# Patient Record
Sex: MALE | Employment: FULL TIME | URBAN - METROPOLITAN AREA
[De-identification: names, ages, dates, MRNs, and addresses within clinical notes are randomized per-mention and may not be internally consistent; named-entity substitution may affect disease eponyms.]

---

## 2020-12-30 ENCOUNTER — HOSPITAL ENCOUNTER (INPATIENT)
Facility: MEDICAL CENTER | Age: 65
LOS: 7 days | DRG: 177 | End: 2021-01-06
Attending: INTERNAL MEDICINE | Admitting: INTERNAL MEDICINE
Payer: COMMERCIAL

## 2020-12-30 DIAGNOSIS — J96.01 ACUTE RESPIRATORY FAILURE WITH HYPOXIA (HCC): ICD-10-CM

## 2020-12-30 DIAGNOSIS — J96.01 ACUTE HYPOXEMIC RESPIRATORY FAILURE DUE TO COVID-19 (HCC): ICD-10-CM

## 2020-12-30 DIAGNOSIS — U07.1 ACUTE HYPOXEMIC RESPIRATORY FAILURE DUE TO COVID-19 (HCC): ICD-10-CM

## 2020-12-30 PROCEDURE — 99223 1ST HOSP IP/OBS HIGH 75: CPT | Performed by: STUDENT IN AN ORGANIZED HEALTH CARE EDUCATION/TRAINING PROGRAM

## 2020-12-30 PROCEDURE — 770021 HCHG ROOM/CARE - ISO PRIVATE

## 2020-12-30 RX ORDER — FUROSEMIDE 20 MG/1
40 TABLET ORAL
Status: DISCONTINUED | OUTPATIENT
Start: 2020-12-31 | End: 2021-01-01

## 2020-12-30 RX ORDER — BISACODYL 10 MG
10 SUPPOSITORY, RECTAL RECTAL
Status: DISCONTINUED | OUTPATIENT
Start: 2020-12-30 | End: 2020-12-31

## 2020-12-30 RX ORDER — POLYETHYLENE GLYCOL 3350 17 G/17G
1 POWDER, FOR SOLUTION ORAL
Status: DISCONTINUED | OUTPATIENT
Start: 2020-12-30 | End: 2020-12-31

## 2020-12-30 RX ORDER — ATORVASTATIN CALCIUM 20 MG/1
20 TABLET, FILM COATED ORAL EVERY EVENING
Status: DISCONTINUED | OUTPATIENT
Start: 2020-12-31 | End: 2020-12-31

## 2020-12-30 RX ORDER — ONDANSETRON 2 MG/ML
4 INJECTION INTRAMUSCULAR; INTRAVENOUS EVERY 6 HOURS PRN
Status: DISCONTINUED | OUTPATIENT
Start: 2020-12-30 | End: 2021-01-06 | Stop reason: HOSPADM

## 2020-12-30 RX ORDER — DEXAMETHASONE 4 MG/1
6 TABLET ORAL DAILY
Status: DISCONTINUED | OUTPATIENT
Start: 2020-12-31 | End: 2021-01-06 | Stop reason: HOSPADM

## 2020-12-30 RX ORDER — GUAIFENESIN/DEXTROMETHORPHAN 100-10MG/5
10 SYRUP ORAL EVERY 6 HOURS PRN
Status: DISCONTINUED | OUTPATIENT
Start: 2020-12-30 | End: 2020-12-31

## 2020-12-30 RX ORDER — LABETALOL HYDROCHLORIDE 5 MG/ML
10 INJECTION, SOLUTION INTRAVENOUS EVERY 6 HOURS PRN
Status: DISCONTINUED | OUTPATIENT
Start: 2020-12-30 | End: 2021-01-06

## 2020-12-30 RX ORDER — ONDANSETRON 4 MG/1
4 TABLET, ORALLY DISINTEGRATING ORAL EVERY 6 HOURS PRN
Status: DISCONTINUED | OUTPATIENT
Start: 2020-12-30 | End: 2021-01-06 | Stop reason: HOSPADM

## 2020-12-30 RX ORDER — OMEPRAZOLE 20 MG/1
40 CAPSULE, DELAYED RELEASE ORAL DAILY
Status: DISCONTINUED | OUTPATIENT
Start: 2020-12-31 | End: 2020-12-31

## 2020-12-30 RX ORDER — AMOXICILLIN 250 MG
2 CAPSULE ORAL 2 TIMES DAILY
Status: DISCONTINUED | OUTPATIENT
Start: 2020-12-30 | End: 2020-12-31

## 2020-12-30 RX ORDER — ACETAMINOPHEN 325 MG/1
650 TABLET ORAL EVERY 6 HOURS PRN
Status: DISCONTINUED | OUTPATIENT
Start: 2020-12-30 | End: 2020-12-30

## 2020-12-30 RX ORDER — ACETAMINOPHEN 325 MG/1
650 TABLET ORAL EVERY 6 HOURS PRN
Status: DISCONTINUED | OUTPATIENT
Start: 2020-12-30 | End: 2021-01-06 | Stop reason: HOSPADM

## 2020-12-30 SDOH — HEALTH STABILITY: MENTAL HEALTH: HOW MANY STANDARD DRINKS CONTAINING ALCOHOL DO YOU HAVE ON A TYPICAL DAY?: PATIENT DECLINED

## 2020-12-30 SDOH — HEALTH STABILITY: MENTAL HEALTH: HOW OFTEN DO YOU HAVE 6 OR MORE DRINKS ON ONE OCCASION?: PATIENT DECLINED

## 2020-12-30 SDOH — HEALTH STABILITY: MENTAL HEALTH: HOW OFTEN DO YOU HAVE A DRINK CONTAINING ALCOHOL?: PATIENT DECLINED

## 2020-12-30 ASSESSMENT — LIFESTYLE VARIABLES
TOTAL SCORE: 0
DOES PATIENT WANT TO STOP DRINKING: NO
ALCOHOL_USE: NO
EVER HAD A DRINK FIRST THING IN THE MORNING TO STEADY YOUR NERVES TO GET RID OF A HANGOVER: NO
HAVE PEOPLE ANNOYED YOU BY CRITICIZING YOUR DRINKING: NO
CONSUMPTION TOTAL: NEGATIVE
ON A TYPICAL DAY WHEN YOU DRINK ALCOHOL HOW MANY DRINKS DO YOU HAVE: 0
HOW MANY TIMES IN THE PAST YEAR HAVE YOU HAD 5 OR MORE DRINKS IN A DAY: 0
TOTAL SCORE: 0
AVERAGE NUMBER OF DAYS PER WEEK YOU HAVE A DRINK CONTAINING ALCOHOL: 0
TOTAL SCORE: 0
HAVE YOU EVER FELT YOU SHOULD CUT DOWN ON YOUR DRINKING: NO
EVER FELT BAD OR GUILTY ABOUT YOUR DRINKING: NO

## 2020-12-30 ASSESSMENT — COGNITIVE AND FUNCTIONAL STATUS - GENERAL
MOBILITY SCORE: 24
SUGGESTED CMS G CODE MODIFIER MOBILITY: CH
DAILY ACTIVITIY SCORE: 24
SUGGESTED CMS G CODE MODIFIER DAILY ACTIVITY: CH

## 2020-12-30 ASSESSMENT — PATIENT HEALTH QUESTIONNAIRE - PHQ9
1. LITTLE INTEREST OR PLEASURE IN DOING THINGS: NOT AT ALL
2. FEELING DOWN, DEPRESSED, IRRITABLE, OR HOPELESS: NOT AT ALL
SUM OF ALL RESPONSES TO PHQ9 QUESTIONS 1 AND 2: 0

## 2020-12-31 ENCOUNTER — APPOINTMENT (OUTPATIENT)
Dept: RADIOLOGY | Facility: MEDICAL CENTER | Age: 65
DRG: 177 | End: 2020-12-31
Attending: STUDENT IN AN ORGANIZED HEALTH CARE EDUCATION/TRAINING PROGRAM
Payer: COMMERCIAL

## 2020-12-31 PROBLEM — J96.01 ACUTE HYPOXEMIC RESPIRATORY FAILURE DUE TO COVID-19 (HCC): Status: ACTIVE | Noted: 2020-12-31

## 2020-12-31 PROBLEM — K21.9 GERD (GASTROESOPHAGEAL REFLUX DISEASE): Status: ACTIVE | Noted: 2020-12-31

## 2020-12-31 PROBLEM — J96.01 ACUTE RESPIRATORY FAILURE WITH HYPOXIA (HCC): Status: ACTIVE | Noted: 2020-12-31

## 2020-12-31 PROBLEM — U07.1 ACUTE HYPOXEMIC RESPIRATORY FAILURE DUE TO COVID-19 (HCC): Status: ACTIVE | Noted: 2020-12-31

## 2020-12-31 PROBLEM — E11.9 DIABETES (HCC): Status: ACTIVE | Noted: 2020-12-31

## 2020-12-31 PROBLEM — E66.9 CLASS 2 OBESITY IN ADULT: Status: ACTIVE | Noted: 2020-12-31

## 2020-12-31 PROBLEM — G47.33 OSA (OBSTRUCTIVE SLEEP APNEA): Status: ACTIVE | Noted: 2020-12-31

## 2020-12-31 PROBLEM — E78.5 HYPERLIPIDEMIA: Status: ACTIVE | Noted: 2020-12-31

## 2020-12-31 PROBLEM — I10 HYPERTENSION: Status: ACTIVE | Noted: 2020-12-31

## 2020-12-31 LAB
ALBUMIN SERPL BCP-MCNC: 3.6 G/DL (ref 3.2–4.9)
ALBUMIN/GLOB SERPL: 1 G/DL
ALP SERPL-CCNC: 89 U/L (ref 30–99)
ALT SERPL-CCNC: 33 U/L (ref 2–50)
ANION GAP SERPL CALC-SCNC: 9 MMOL/L (ref 7–16)
AST SERPL-CCNC: 29 U/L (ref 12–45)
BASOPHILS # BLD AUTO: 0.4 % (ref 0–1.8)
BASOPHILS # BLD: 0.03 K/UL (ref 0–0.12)
BILIRUB SERPL-MCNC: 0.4 MG/DL (ref 0.1–1.5)
BUN SERPL-MCNC: 13 MG/DL (ref 8–22)
CALCIUM SERPL-MCNC: 8.6 MG/DL (ref 8.5–10.5)
CHLORIDE SERPL-SCNC: 103 MMOL/L (ref 96–112)
CO2 SERPL-SCNC: 27 MMOL/L (ref 20–33)
CREAT SERPL-MCNC: 0.74 MG/DL (ref 0.5–1.4)
D DIMER PPP IA.FEU-MCNC: 0.67 UG/ML (FEU) (ref 0–0.5)
EOSINOPHIL # BLD AUTO: 0 K/UL (ref 0–0.51)
EOSINOPHIL NFR BLD: 0 % (ref 0–6.9)
ERYTHROCYTE [DISTWIDTH] IN BLOOD BY AUTOMATED COUNT: 53.2 FL (ref 35.9–50)
EST. AVERAGE GLUCOSE BLD GHB EST-MCNC: 163 MG/DL
GLOBULIN SER CALC-MCNC: 3.6 G/DL (ref 1.9–3.5)
GLUCOSE BLD-MCNC: 146 MG/DL (ref 65–99)
GLUCOSE BLD-MCNC: 164 MG/DL (ref 65–99)
GLUCOSE BLD-MCNC: 171 MG/DL (ref 65–99)
GLUCOSE BLD-MCNC: 195 MG/DL (ref 65–99)
GLUCOSE BLD-MCNC: 243 MG/DL (ref 65–99)
GLUCOSE SERPL-MCNC: 205 MG/DL (ref 65–99)
HBA1C MFR BLD: 7.3 % (ref 0–5.6)
HCT VFR BLD AUTO: 55.5 % (ref 42–52)
HGB BLD-MCNC: 16.8 G/DL (ref 14–18)
IMM GRANULOCYTES # BLD AUTO: 0.07 K/UL (ref 0–0.11)
IMM GRANULOCYTES NFR BLD AUTO: 0.9 % (ref 0–0.9)
LYMPHOCYTES # BLD AUTO: 0.62 K/UL (ref 1–4.8)
LYMPHOCYTES NFR BLD: 8.4 % (ref 22–41)
MAGNESIUM SERPL-MCNC: 2.1 MG/DL (ref 1.5–2.5)
MCH RBC QN AUTO: 28.5 PG (ref 27–33)
MCHC RBC AUTO-ENTMCNC: 30.3 G/DL (ref 33.7–35.3)
MCV RBC AUTO: 94.1 FL (ref 81.4–97.8)
MONOCYTES # BLD AUTO: 0.22 K/UL (ref 0–0.85)
MONOCYTES NFR BLD AUTO: 3 % (ref 0–13.4)
NEUTROPHILS # BLD AUTO: 6.43 K/UL (ref 1.82–7.42)
NEUTROPHILS NFR BLD: 87.3 % (ref 44–72)
NRBC # BLD AUTO: 0 K/UL
NRBC BLD-RTO: 0 /100 WBC
NT-PROBNP SERPL IA-MCNC: 114 PG/ML (ref 0–125)
PHOSPHATE SERPL-MCNC: 2.9 MG/DL (ref 2.5–4.5)
PLATELET # BLD AUTO: 178 K/UL (ref 164–446)
PMV BLD AUTO: 10.5 FL (ref 9–12.9)
POTASSIUM SERPL-SCNC: 4.2 MMOL/L (ref 3.6–5.5)
PROCALCITONIN SERPL-MCNC: 0.06 NG/ML
PROT SERPL-MCNC: 7.2 G/DL (ref 6–8.2)
RBC # BLD AUTO: 5.9 M/UL (ref 4.7–6.1)
SODIUM SERPL-SCNC: 139 MMOL/L (ref 135–145)
WBC # BLD AUTO: 7.4 K/UL (ref 4.8–10.8)

## 2020-12-31 PROCEDURE — 83880 ASSAY OF NATRIURETIC PEPTIDE: CPT

## 2020-12-31 PROCEDURE — 83735 ASSAY OF MAGNESIUM: CPT

## 2020-12-31 PROCEDURE — 85025 COMPLETE CBC W/AUTO DIFF WBC: CPT

## 2020-12-31 PROCEDURE — 84100 ASSAY OF PHOSPHORUS: CPT

## 2020-12-31 PROCEDURE — 87040 BLOOD CULTURE FOR BACTERIA: CPT

## 2020-12-31 PROCEDURE — 80053 COMPREHEN METABOLIC PANEL: CPT

## 2020-12-31 PROCEDURE — 86738 MYCOPLASMA ANTIBODY: CPT

## 2020-12-31 PROCEDURE — 82962 GLUCOSE BLOOD TEST: CPT | Mod: 91

## 2020-12-31 PROCEDURE — 94760 N-INVAS EAR/PLS OXIMETRY 1: CPT

## 2020-12-31 PROCEDURE — 99232 SBSQ HOSP IP/OBS MODERATE 35: CPT | Performed by: STUDENT IN AN ORGANIZED HEALTH CARE EDUCATION/TRAINING PROGRAM

## 2020-12-31 PROCEDURE — 700111 HCHG RX REV CODE 636 W/ 250 OVERRIDE (IP): Performed by: STUDENT IN AN ORGANIZED HEALTH CARE EDUCATION/TRAINING PROGRAM

## 2020-12-31 PROCEDURE — 36415 COLL VENOUS BLD VENIPUNCTURE: CPT

## 2020-12-31 PROCEDURE — 83036 HEMOGLOBIN GLYCOSYLATED A1C: CPT

## 2020-12-31 PROCEDURE — 84145 PROCALCITONIN (PCT): CPT

## 2020-12-31 PROCEDURE — 71045 X-RAY EXAM CHEST 1 VIEW: CPT

## 2020-12-31 PROCEDURE — 85379 FIBRIN DEGRADATION QUANT: CPT

## 2020-12-31 PROCEDURE — 770014 HCHG ROOM/CARE - WARD (150)

## 2020-12-31 PROCEDURE — 700102 HCHG RX REV CODE 250 W/ 637 OVERRIDE(OP): Performed by: STUDENT IN AN ORGANIZED HEALTH CARE EDUCATION/TRAINING PROGRAM

## 2020-12-31 PROCEDURE — A9270 NON-COVERED ITEM OR SERVICE: HCPCS | Performed by: STUDENT IN AN ORGANIZED HEALTH CARE EDUCATION/TRAINING PROGRAM

## 2020-12-31 RX ORDER — DEXTROSE MONOHYDRATE 25 G/50ML
50 INJECTION, SOLUTION INTRAVENOUS
Status: DISCONTINUED | OUTPATIENT
Start: 2020-12-31 | End: 2021-01-06 | Stop reason: HOSPADM

## 2020-12-31 RX ORDER — DEXTROSE MONOHYDRATE 25 G/50ML
50 INJECTION, SOLUTION INTRAVENOUS
Status: DISCONTINUED | OUTPATIENT
Start: 2020-12-31 | End: 2020-12-31

## 2020-12-31 RX ORDER — INSULIN GLARGINE 100 [IU]/ML
20 INJECTION, SOLUTION SUBCUTANEOUS EVERY EVENING
Status: DISCONTINUED | OUTPATIENT
Start: 2020-12-31 | End: 2020-12-31

## 2020-12-31 RX ORDER — AZITHROMYCIN 250 MG/1
500 TABLET, FILM COATED ORAL DAILY
Status: DISCONTINUED | OUTPATIENT
Start: 2020-12-31 | End: 2020-12-31

## 2020-12-31 RX ORDER — INSULIN GLARGINE 100 [IU]/ML
20 INJECTION, SOLUTION SUBCUTANEOUS
Status: DISCONTINUED | OUTPATIENT
Start: 2020-12-31 | End: 2021-01-06 | Stop reason: HOSPADM

## 2020-12-31 RX ORDER — INSULIN GLARGINE 100 [IU]/ML
10 INJECTION, SOLUTION SUBCUTANEOUS
Status: DISCONTINUED | OUTPATIENT
Start: 2020-12-31 | End: 2020-12-31

## 2020-12-31 RX ORDER — GUAIFENESIN 600 MG/1
600 TABLET, EXTENDED RELEASE ORAL EVERY 12 HOURS PRN
Status: DISCONTINUED | OUTPATIENT
Start: 2020-12-31 | End: 2021-01-06 | Stop reason: HOSPADM

## 2020-12-31 RX ORDER — AMOXICILLIN AND CLAVULANATE POTASSIUM 875; 125 MG/1; MG/1
1 TABLET, FILM COATED ORAL EVERY 12 HOURS
Status: DISCONTINUED | OUTPATIENT
Start: 2020-12-31 | End: 2020-12-31

## 2020-12-31 RX ORDER — ASPIRIN 81 MG/1
81 TABLET, CHEWABLE ORAL DAILY
Status: DISCONTINUED | OUTPATIENT
Start: 2020-12-31 | End: 2021-01-06 | Stop reason: HOSPADM

## 2020-12-31 RX ADMIN — INSULIN GLARGINE 20 UNITS: 100 INJECTION, SOLUTION SUBCUTANEOUS at 09:37

## 2020-12-31 RX ADMIN — FUROSEMIDE 40 MG: 20 TABLET ORAL at 09:36

## 2020-12-31 RX ADMIN — DEXAMETHASONE 6 MG: 4 TABLET ORAL at 09:37

## 2020-12-31 RX ADMIN — ASPIRIN 81 MG CHEWABLE TABLET 81 MG: 81 TABLET CHEWABLE at 05:05

## 2020-12-31 RX ADMIN — ENOXAPARIN SODIUM 40 MG: 40 INJECTION SUBCUTANEOUS at 09:37

## 2020-12-31 SDOH — ECONOMIC STABILITY: FOOD INSECURITY: WITHIN THE PAST 12 MONTHS, YOU WORRIED THAT YOUR FOOD WOULD RUN OUT BEFORE YOU GOT MONEY TO BUY MORE.: PATIENT DECLINED

## 2020-12-31 SDOH — ECONOMIC STABILITY: FOOD INSECURITY: WITHIN THE PAST 12 MONTHS, THE FOOD YOU BOUGHT JUST DIDN'T LAST AND YOU DIDN'T HAVE MONEY TO GET MORE.: PATIENT DECLINED

## 2020-12-31 SDOH — ECONOMIC STABILITY: TRANSPORTATION INSECURITY
IN THE PAST 12 MONTHS, HAS LACK OF TRANSPORTATION KEPT YOU FROM MEETINGS, WORK, OR FROM GETTING THINGS NEEDED FOR DAILY LIVING?: PATIENT DECLINED

## 2020-12-31 SDOH — ECONOMIC STABILITY: TRANSPORTATION INSECURITY
IN THE PAST 12 MONTHS, HAS THE LACK OF TRANSPORTATION KEPT YOU FROM MEDICAL APPOINTMENTS OR FROM GETTING MEDICATIONS?: PATIENT DECLINED

## 2020-12-31 ASSESSMENT — ENCOUNTER SYMPTOMS
FEVER: 0
BRUISES/BLEEDS EASILY: 0
DIARRHEA: 0
SENSORY CHANGE: 0
CONSTIPATION: 0
NAUSEA: 0
EYE DISCHARGE: 0
FLANK PAIN: 0
BACK PAIN: 0
HALLUCINATIONS: 0
BLURRED VISION: 0
VOMITING: 0
COUGH: 1
PALPITATIONS: 0
FOCAL WEAKNESS: 0
FEVER: 1
SPEECH CHANGE: 0
MYALGIAS: 1
ABDOMINAL PAIN: 0
NECK PAIN: 0
WEAKNESS: 1
FALLS: 0
SHORTNESS OF BREATH: 1
CHILLS: 1
WHEEZING: 0
CHILLS: 0
HEADACHES: 0
DOUBLE VISION: 0
DIZZINESS: 0
SORE THROAT: 0
LOSS OF CONSCIOUSNESS: 0
DIAPHORESIS: 0
HEMOPTYSIS: 0
NERVOUS/ANXIOUS: 0
TREMORS: 0
EYE PAIN: 0
DEPRESSION: 0
EYE REDNESS: 0

## 2020-12-31 ASSESSMENT — LIFESTYLE VARIABLES: SUBSTANCE_ABUSE: 0

## 2020-12-31 ASSESSMENT — COPD QUESTIONNAIRES
COPD SCREENING SCORE: 2
DO YOU EVER COUGH UP ANY MUCUS OR PHLEGM?: NO/ONLY WITH OCCASIONAL COLDS OR INFECTIONS
HAVE YOU SMOKED AT LEAST 100 CIGARETTES IN YOUR ENTIRE LIFE: NO/DON'T KNOW
DURING THE PAST 4 WEEKS HOW MUCH DID YOU FEEL SHORT OF BREATH: NONE/LITTLE OF THE TIME

## 2020-12-31 ASSESSMENT — PAIN DESCRIPTION - PAIN TYPE
TYPE: ACUTE PAIN
TYPE: ACUTE PAIN

## 2020-12-31 NOTE — ASSESSMENT & PLAN NOTE
Procalcitonin 0.06, proBNP 114, D-dimer 0.67, WBC 7.4  COVID-19 positive 12/30/20 at [place] [facility]  Echo 1/1/2021 unremarkable with normal EF of 65%, dilated ascending aorta 4.3 CM  Continue steroids regimen (last dose 1/8)  Continue remdesivir  Fluid restriction and diuresis with Lasix  Frequent pronation incentive spirometer  Up to chair for meals  Labs in a.m.

## 2020-12-31 NOTE — PROGRESS NOTES
[facility]PITAL[facility] TRIAGE OFFICER DIRECT ADMISSION REPORT  Transferring facility: [place] [facility]  Transferring physician: Dr Grant    Chief complaint: Hypoxemia  Pertinent history & patient course: 65-year-old male with hypertension, diabetes, obesity, who presented to the [facility] ED with hypoxemia.  He tested positive for COVID-19 after he presented with headaches since [place].  Labs showed lymphopenia.  Chest x-ray showed COVID infiltrates.  Patient was given Decadron.  He was saturating 65% on room air, and with 4 to 6 L oxygen by nasal cannula was desaturating to the mid 80s.  He was then placed on 6 L of nonrebreather mask with saturations in the mid 90s.  Otherwise, patient is hemodynamically stable.  The [facility] is not able to provide higher oxygen supplement such as high flow or BiPAP, and thus transfer for direct admission was requested.    Further work up or recommendations per triage officer prior to transfer: None  Consultants called prior to transfer and pertinent input from consultants: None  Patient accepted for transfer: Yes  Consultants to be called upon arrival: None  Admission status: Inpatient.   Floor requested: SMT/T7      Please inform the triage officer upon arrival of the patient to [facility] for assignment of a hospitalist to perform admission.     For any question or concerns regarding the care of this patient, please reach out to the assigned hospitalist.

## 2020-12-31 NOTE — THERAPY
Missed Therapy     Patient Name: Momo Gonzalez  Age:  65 y.o., Sex:  male  Medical Record #: [id number]  Today's Date: 12/31/2020 12/31/20 1132   Treatment Variance   Reason For Missed Therapy Non-Medical - Other (Please Comment)   Initial Contact Note    Initial Contact Note  Order Received and Verified. Speech Therapy Evaluation NOT Completed Because Patient Does Not Require Acute Speech Therapy at this Time.   Interdisciplinary Plan of Care Collaboration   IDT Collaboration with  Nursing   Collaboration Comments Per RN, clinical swallow evaluation not indicated. Tolerating regular diet and OK to cancel order. Please re-consult with any concern for aspiration or change in status.

## 2020-12-31 NOTE — H&P
Hospital Medicine History & Physical Note    Date of Service  12/31/2020    Primary Care Physician  No primary care provider on file.    Consultants  None    Code Status  Full Code    Chief Complaint  SOB    History of Presenting Illness  65 y.o. male with history of hypertension, diabetes, hyperlipidemia who presented 12/30/2020 to [place] ED with complains of hypoxia and shortness of breath then transferred to [facility] as direct admit after patient was found to have COVID-19.  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  He stated that his wife is also sick with COVID-19.  Reported generalized myalgia, subjective fever, headaches and progressive shortness of breath.  Patient noted his oxygen level as low as 50% in [place] ED, improved with 5 to 6 L oxygen support.  Patient was transferred to [facility] for higher level care.  At time of my evaluation, his saturation greater than 92% on 5 L, alert awake with no apparent respiratory distress.  He was given Decadron 6 mg prior to transfer.  Will transfer patient to [facility] will close monitoring and possible discharge in a.m. with home oxygen.    Review of Systems  Review of Systems   Constitutional: Positive for chills, fever and malaise/fatigue.   HENT: Negative for hearing loss and tinnitus.    Eyes: Negative for blurred vision and double vision.   Respiratory: Positive for cough and shortness of breath. Negative for hemoptysis.    Cardiovascular: Positive for leg swelling. Negative for chest pain and palpitations.   Gastrointestinal: Negative for abdominal pain and vomiting.   Genitourinary: Negative for dysuria and urgency.   Musculoskeletal: Positive for myalgias. Negative for falls.   Skin: Negative for itching and rash.   Neurological: Negative for dizziness, speech change, focal weakness and headaches.   Endo/Heme/Allergies: Does not bruise/bleed  easily.   Psychiatric/Behavioral: Negative for depression and suicidal ideas.   All other systems reviewed and are negative.      Past Medical History   has no past medical history on file.  Hypertension, hyperlipidemia, diabetes  MICHELLE no longer dependent on CPAP    Surgical History   has no past surgical history on file.   Tonsillectomy, uvulectomy for CPAP    Family History  family history is not on file.   Father  of brain cancer  Sibling, sister had PE unknown age    Social History  Distant history of smoking undergraduate years, no longer smokes  Social EtOH use    Allergies  No Known Allergies    Medications  None   Amlodipine 10 mg  Valsartan 40 mg  Metformin 500 mg twice daily  Atorvastatin 20 mg bedtime  Omeprazole 40 mg  Aspirin 81 mg    Physical Exam  Temp:  [35.8 °C (96.5 °F)-36.7 °C (98.1 °F)] 36.2 °C (97.2 °F)  Pulse:  [75-91] 76  Resp:  [16-22] 20  BP: ()/(58-77) 128/71  SpO2:  [91 %-98 %] 92 %    Physical Exam  Vitals signs and nursing note reviewed.   Constitutional:       Appearance: He is obese.   HENT:      Head: Normocephalic and atraumatic.      Mouth/Throat:      Mouth: Mucous membranes are dry.   Eyes:      General: No scleral icterus.     Extraocular Movements: Extraocular movements intact.   Neck:      Musculoskeletal: Neck supple. No muscular tenderness.   Cardiovascular:      Rate and Rhythm: Normal rate and regular rhythm.   Pulmonary:      Effort: Pulmonary effort is normal.      Comments: Diffuse inspiratory Rales and crackles  Decreased bibasilar breath sounds with appreciable crackles  Abdominal:      Palpations: Abdomen is soft.      Tenderness: There is no abdominal tenderness. There is no guarding or rebound.   Musculoskeletal: Normal range of motion.         General: No tenderness.      Comments: Trace lower extremity edema   Skin:     General: Skin is warm and dry.      Capillary Refill: Capillary refill takes less than 2 seconds.   Neurological:      General: No focal  deficit present.      Mental Status: He is alert and oriented to person, place, and time.      Motor: No weakness.   Psychiatric:         Mood and Affect: Mood normal.         Behavior: Behavior normal.         Laboratory:  Recent Labs     12/31/20  0012   WBC 7.4   RBC 5.90   HEMOGLOBIN 16.8   HEMATOCRIT 55.5*   MCV 94.1   MCH 28.5   MCHC 30.3*   RDW 53.2*   PLATELETCT 178   MPV 10.5     Recent Labs     12/31/20  0012   SODIUM 139   POTASSIUM 4.2   CHLORIDE 103   CO2 27   GLUCOSE 205*   BUN 13   CREATININE 0.74   CALCIUM 8.6     Recent Labs     12/31/20  0012   ALTSGPT 33   ASTSGOT 29   ALKPHOSPHAT 89   TBILIRUBIN 0.4   GLUCOSE 205*         Recent Labs     12/31/20  0012   NTPROBNP 114         No results for input(s): TROPONINT in the last 72 hours.    Imaging:  DX-CHEST-PORTABLE (1 VIEW)   Final Result         Diffuse interstitial prominence and hazy peripheral opacities bilaterally, concerning for Covid 19 pneumonia            Assessment/Plan:  I anticipate this patient is appropriate for observation status at this time.    * Acute hypoxemic respiratory failure due to COVID-19 (HCC)  Assessment & Plan  Procalcitonin 0.06, proBNP 114, D-dimer 0.67, WBC 7.4  COVID-19 positive 12/30/20 at [place] ED    Decadron 6mg x10 doses (12/30-1/8)  Procal WNL - no superimposed bacterial PNA  Currently on 5L oxygen - wean off as tolerated  No role for Remdesivir at this time - re-eval in AM  Self-proning encouraged    Acute respiratory failure with hypoxia ([facility])  Assessment & Plan  Secondary Covid  Minimally levated D-dimer defer LE doppler / CTA PE workup at this time  Diuresis as tolerated - Lasix 40mg PO daily x3 doses    Diabetes (HCC)  Assessment & Plan  Hold home metformin,    HbA1c 7.3  Lantus + ISS    Hypertension  Assessment & Plan  HOLD home amlodipine and losartan  Diurese with Lasix    GERD (gastroesophageal reflux disease)  Assessment & Plan  Omeprazole    MICHELLE (obstructive sleep apnea)  Assessment &  Plan  No longer requires CPAP since uvulectomy and tonsillectomy    Class 2 obesity in adult  Assessment & Plan  Diet and lifestyle modification discussed    Hyperlipidemia  Assessment & Plan  Continue statin    VTE ppx: Lovenox  Diet: DM/cardiac  Code: full - pt requested to be asked again in AM, considering DNR given his age  Dispo: Admit to [facility]

## 2020-12-31 NOTE — PROGRESS NOTES
[facility] Medicine Daily Progress Note    Date of Service  12/31/2020    Chief Complaint  65 y.o. male admitted 12/30/2020 with shortness of breath    Hospital Course  65 y.o. male with history of hypertension, diabetes, hyperlipidemia who presented 12/30/2020 to [place] ED with complains of hypoxia and shortness of breath then transferred to [facility] as direct admit after patient was found to have COVID-19.  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  He stated that his wife is also sick with COVID-19.  Reported generalized myalgia, subjective fever, headaches and progressive shortness of breath.  Patient noted his oxygen level as low as 50% in [place] ED, improved with 5 to 6 L oxygen support.  Patient was transferred to [facility] for higher level care.  At time of my evaluation, his saturation greater than 92% on 5 L, alert awake with no apparent respiratory distress.  He was given Decadron 6 mg prior to transfer.  Will transfer patient to [facility] will close monitoring and possible discharge in a.m. with home oxygen.      Interval Problem Update  I evaluated and examined the patient at the bedside.  Labs and imaging were reviewed. Care plan was discussed with the patient and bedside nurse.    Patient reported shortness of breath.  He denies nausea vomiting , chest pain , dizziness . oxygen increased to 6 L with oxygen saturation 93%    On Decadron, Lasix    Diabetes on lantus and sliding scale      Consultants/Specialty      Code Status  Full Code    Disposition  TBD    Review of Systems  Review of Systems   Constitutional: Positive for malaise/fatigue. Negative for chills, diaphoresis and fever.   HENT: Negative for congestion, ear discharge, ear pain and sore throat.    Eyes: Negative for pain, discharge and redness.   Respiratory: Positive for cough and shortness of breath. Negative for hemoptysis and  wheezing.    Cardiovascular: Negative for chest pain, palpitations and leg swelling.   Gastrointestinal: Negative for abdominal pain, constipation, diarrhea, nausea and vomiting.   Genitourinary: Negative for dysuria, flank pain, frequency, hematuria and urgency.   Musculoskeletal: Negative for back pain, falls, joint pain and neck pain.   Skin: Negative for itching.   Neurological: Positive for weakness. Negative for dizziness, tremors, sensory change, speech change, focal weakness, loss of consciousness and headaches.   Psychiatric/Behavioral: Negative for depression, hallucinations and substance abuse. The patient is not nervous/anxious.         Physical Exam  Temp:  [35.8 °C (96.5 °F)-36.7 °C (98.1 °F)] 36.1 °C (97 °F)  Pulse:  [82-91] 83  Resp:  [18-22] 18  BP: ()/(58-77) 119/77  SpO2:  [91 %-98 %] 93 %    Physical Exam  Constitutional:       General: He is not in acute distress.     Appearance: Normal appearance. He is obese. He is not ill-appearing or diaphoretic.   HENT:      Head: Normocephalic and atraumatic.      Nose: Nose normal.      Mouth/Throat:      Pharynx: Oropharynx is clear. No oropharyngeal exudate or posterior oropharyngeal erythema.   Eyes:      General:         Right eye: No discharge.         Left eye: No discharge.      Extraocular Movements: Extraocular movements intact.      Conjunctiva/sclera: Conjunctivae normal.      Pupils: Pupils are equal, round, and reactive to light.   Neck:      Musculoskeletal: Normal range of motion and neck supple. No neck rigidity or muscular tenderness.   Cardiovascular:      Rate and Rhythm: Normal rate and regular rhythm.      Pulses: Normal pulses.      Heart sounds: Normal heart sounds. No gallop.    Pulmonary:      Effort: Respiratory distress present.      Breath sounds: No stridor. Rales present. No wheezing or rhonchi.      Comments: Decreased breathing sounds  Chest:      Chest wall: No tenderness.   Abdominal:      General: Bowel sounds are  normal. There is no distension.      Palpations: Abdomen is soft.      Tenderness: There is no abdominal tenderness. There is no guarding or rebound.   Musculoskeletal: Normal range of motion.         General: No swelling, tenderness, deformity or signs of injury.      Right lower leg: No edema.      Left lower leg: No edema.   Neurological:      General: No focal deficit present.      Mental Status: He is alert and oriented to person, place, and time. Mental status is at baseline.      Sensory: No sensory deficit.      Motor: No weakness.      Gait: Gait normal.   Psychiatric:         Mood and Affect: Mood normal.         Behavior: Behavior normal.         Thought Content: Thought content normal.         Fluids  No intake or output data in the 24 hours ending 12/31/20 1232    Laboratory  Recent Labs     12/31/20  0012   WBC 7.4   RBC 5.90   HEMOGLOBIN 16.8   HEMATOCRIT 55.5*   MCV 94.1   MCH 28.5   MCHC 30.3*   RDW 53.2*   PLATELETCT 178   MPV 10.5     Recent Labs     12/31/20  0012   SODIUM 139   POTASSIUM 4.2   CHLORIDE 103   CO2 27   GLUCOSE 205*   BUN 13   CREATININE 0.74   CALCIUM 8.6                   Imaging  DX-CHEST-PORTABLE (1 VIEW)   Final Result         Diffuse interstitial prominence and hazy peripheral opacities bilaterally, concerning for Covid 19 pneumonia           Assessment/Plan  * Acute hypoxemic respiratory failure due to COVID-19 (HCC)  Assessment & Plan  Procalcitonin 0.06, proBNP 114, D-dimer 0.67, WBC 7.4  COVID-19 positive 12/30/20 at [place] ED    Decadron 6mg x10 doses (12/30-1/8)  Procal WNL - no superimposed bacterial PNA  Currently on 5L oxygen - wean off as tolerated  No role for Remdesivir at this time - re-eval in AM  Self-proning encouraged    Acute respiratory failure with hypoxia (HCC)  Assessment & Plan  Secondary Covid  Minimally levated D-dimer defer LE doppler / CTA PE workup at this time  Diuresis as tolerated - Lasix 40mg PO daily x3 doses    Diabetes  (HCC)  Assessment & Plan  Hold home metformin,    HbA1c 7.3  Lantus + ISS    Hypertension  Assessment & Plan  Go home amlodipine and losartan  Diurese with Lasix    GERD (gastroesophageal reflux disease)  Assessment & Plan  Omeprazole    MICHELLE (obstructive sleep apnea)  Assessment & Plan  No longer requires CPAP since uvulectomy and tonsillectomy    Class 2 obesity in adult  Assessment & Plan  Diet and lifestyle modification discussed    Hyperlipidemia  Assessment & Plan  Continue statin       VTE prophylaxis: Lovenox

## 2020-12-31 NOTE — ASSESSMENT & PLAN NOTE
Secondary Covid  Minimally levated D-dimer defer LE doppler / CTA PE workup at this time  Diuresis as tolerated - Lasix 40mg PO daily x3 doses

## 2021-01-01 ENCOUNTER — APPOINTMENT (OUTPATIENT)
Dept: CARDIOLOGY | Facility: MEDICAL CENTER | Age: 66
DRG: 177 | End: 2021-01-01
Attending: STUDENT IN AN ORGANIZED HEALTH CARE EDUCATION/TRAINING PROGRAM
Payer: COMMERCIAL

## 2021-01-01 PROBLEM — D72.829 LEUKOCYTOSIS: Status: ACTIVE | Noted: 2021-01-01

## 2021-01-01 PROBLEM — I77.810 ASCENDING AORTA DILATATION (HCC): Status: ACTIVE | Noted: 2021-01-01

## 2021-01-01 LAB
ANION GAP SERPL CALC-SCNC: 10 MMOL/L (ref 7–16)
BASOPHILS # BLD AUTO: 0.2 % (ref 0–1.8)
BASOPHILS # BLD: 0.02 K/UL (ref 0–0.12)
BUN SERPL-MCNC: 22 MG/DL (ref 8–22)
CALCIUM SERPL-MCNC: 8.8 MG/DL (ref 8.5–10.5)
CHLORIDE SERPL-SCNC: 100 MMOL/L (ref 96–112)
CO2 SERPL-SCNC: 28 MMOL/L (ref 20–33)
CREAT SERPL-MCNC: 0.78 MG/DL (ref 0.5–1.4)
EOSINOPHIL # BLD AUTO: 0 K/UL (ref 0–0.51)
EOSINOPHIL NFR BLD: 0 % (ref 0–6.9)
ERYTHROCYTE [DISTWIDTH] IN BLOOD BY AUTOMATED COUNT: 52.6 FL (ref 35.9–50)
GLUCOSE BLD-MCNC: 140 MG/DL (ref 65–99)
GLUCOSE BLD-MCNC: 141 MG/DL (ref 65–99)
GLUCOSE BLD-MCNC: 144 MG/DL (ref 65–99)
GLUCOSE BLD-MCNC: 149 MG/DL (ref 65–99)
GLUCOSE SERPL-MCNC: 185 MG/DL (ref 65–99)
HCT VFR BLD AUTO: 55.8 % (ref 42–52)
HGB BLD-MCNC: 16.8 G/DL (ref 14–18)
IMM GRANULOCYTES # BLD AUTO: 0.08 K/UL (ref 0–0.11)
IMM GRANULOCYTES NFR BLD AUTO: 0.6 % (ref 0–0.9)
LV EJECT FRACT MOD 2C 99903: 71.97
LV EJECT FRACT MOD 4C 99902: 76.65
LV EJECT FRACT MOD BP 99901: 74.95
LYMPHOCYTES # BLD AUTO: 0.55 K/UL (ref 1–4.8)
LYMPHOCYTES NFR BLD: 4.2 % (ref 22–41)
M PNEUMO IGM SER IA-ACNC: 0.08 U/L
MCH RBC QN AUTO: 28.4 PG (ref 27–33)
MCHC RBC AUTO-ENTMCNC: 30.1 G/DL (ref 33.7–35.3)
MCV RBC AUTO: 94.4 FL (ref 81.4–97.8)
MONOCYTES # BLD AUTO: 0.66 K/UL (ref 0–0.85)
MONOCYTES NFR BLD AUTO: 5 % (ref 0–13.4)
NEUTROPHILS # BLD AUTO: 11.79 K/UL (ref 1.82–7.42)
NEUTROPHILS NFR BLD: 90 % (ref 44–72)
NRBC # BLD AUTO: 0 K/UL
NRBC BLD-RTO: 0 /100 WBC
PLATELET # BLD AUTO: 228 K/UL (ref 164–446)
PMV BLD AUTO: 10.7 FL (ref 9–12.9)
POTASSIUM SERPL-SCNC: 4.4 MMOL/L (ref 3.6–5.5)
RBC # BLD AUTO: 5.91 M/UL (ref 4.7–6.1)
SODIUM SERPL-SCNC: 138 MMOL/L (ref 135–145)
WBC # BLD AUTO: 13.1 K/UL (ref 4.8–10.8)

## 2021-01-01 PROCEDURE — 700102 HCHG RX REV CODE 250 W/ 637 OVERRIDE(OP): Performed by: STUDENT IN AN ORGANIZED HEALTH CARE EDUCATION/TRAINING PROGRAM

## 2021-01-01 PROCEDURE — 700105 HCHG RX REV CODE 258: Performed by: INTERNAL MEDICINE

## 2021-01-01 PROCEDURE — 700111 HCHG RX REV CODE 636 W/ 250 OVERRIDE (IP): Performed by: STUDENT IN AN ORGANIZED HEALTH CARE EDUCATION/TRAINING PROGRAM

## 2021-01-01 PROCEDURE — 82962 GLUCOSE BLOOD TEST: CPT | Mod: 91

## 2021-01-01 PROCEDURE — 85025 COMPLETE CBC W/AUTO DIFF WBC: CPT

## 2021-01-01 PROCEDURE — 770014 HCHG ROOM/CARE - WARD (150)

## 2021-01-01 PROCEDURE — 36415 COLL VENOUS BLD VENIPUNCTURE: CPT

## 2021-01-01 PROCEDURE — 93325 DOPPLER ECHO COLOR FLOW MAPG: CPT

## 2021-01-01 PROCEDURE — A9270 NON-COVERED ITEM OR SERVICE: HCPCS | Performed by: STUDENT IN AN ORGANIZED HEALTH CARE EDUCATION/TRAINING PROGRAM

## 2021-01-01 PROCEDURE — XW033E5 INTRODUCTION OF REMDESIVIR ANTI-INFECTIVE INTO PERIPHERAL VEIN, PERCUTANEOUS APPROACH, NEW TECHNOLOGY GROUP 5: ICD-10-PCS | Performed by: INTERNAL MEDICINE

## 2021-01-01 PROCEDURE — 99233 SBSQ HOSP IP/OBS HIGH 50: CPT | Performed by: STUDENT IN AN ORGANIZED HEALTH CARE EDUCATION/TRAINING PROGRAM

## 2021-01-01 PROCEDURE — 80048 BASIC METABOLIC PNL TOTAL CA: CPT

## 2021-01-01 PROCEDURE — 700101 HCHG RX REV CODE 250: Performed by: INTERNAL MEDICINE

## 2021-01-01 PROCEDURE — 93308 TTE F-UP OR LMTD: CPT | Mod: 26 | Performed by: INTERNAL MEDICINE

## 2021-01-01 PROCEDURE — 97161 PT EVAL LOW COMPLEX 20 MIN: CPT

## 2021-01-01 RX ORDER — FUROSEMIDE 10 MG/ML
40 INJECTION INTRAMUSCULAR; INTRAVENOUS ONCE
Status: COMPLETED | OUTPATIENT
Start: 2021-01-01 | End: 2021-01-01

## 2021-01-01 RX ADMIN — REMDESIVIR 200 MG: 100 INJECTION, POWDER, LYOPHILIZED, FOR SOLUTION INTRAVENOUS at 14:42

## 2021-01-01 RX ADMIN — INSULIN GLARGINE 20 UNITS: 100 INJECTION, SOLUTION SUBCUTANEOUS at 05:57

## 2021-01-01 RX ADMIN — ASPIRIN 81 MG CHEWABLE TABLET 81 MG: 81 TABLET CHEWABLE at 06:00

## 2021-01-01 RX ADMIN — ENOXAPARIN SODIUM 40 MG: 40 INJECTION SUBCUTANEOUS at 05:59

## 2021-01-01 RX ADMIN — DEXAMETHASONE 6 MG: 4 TABLET ORAL at 06:00

## 2021-01-01 RX ADMIN — FUROSEMIDE 40 MG: 10 INJECTION, SOLUTION INTRAMUSCULAR; INTRAVENOUS at 11:38

## 2021-01-01 RX ADMIN — FUROSEMIDE 40 MG: 20 TABLET ORAL at 05:59

## 2021-01-01 ASSESSMENT — ENCOUNTER SYMPTOMS
NAUSEA: 0
HALLUCINATIONS: 0
DIZZINESS: 0
PALPITATIONS: 0
DEPRESSION: 0
FALLS: 0
CHILLS: 0
SENSORY CHANGE: 0
EYE REDNESS: 0
SORE THROAT: 0
EYE PAIN: 0
CONSTIPATION: 0
ABDOMINAL PAIN: 0
WHEEZING: 0
SPEECH CHANGE: 0
HEMOPTYSIS: 0
FLANK PAIN: 0
EYE DISCHARGE: 0
NERVOUS/ANXIOUS: 0
DIARRHEA: 0
FEVER: 0
LOSS OF CONSCIOUSNESS: 0
HEADACHES: 0
WEAKNESS: 1
SHORTNESS OF BREATH: 1
DIAPHORESIS: 0
FOCAL WEAKNESS: 0
NECK PAIN: 0
COUGH: 1
VOMITING: 0
BACK PAIN: 0
TREMORS: 0

## 2021-01-01 ASSESSMENT — COGNITIVE AND FUNCTIONAL STATUS - GENERAL
MOBILITY SCORE: 24
SUGGESTED CMS G CODE MODIFIER MOBILITY: CH

## 2021-01-01 ASSESSMENT — LIFESTYLE VARIABLES: SUBSTANCE_ABUSE: 0

## 2021-01-01 ASSESSMENT — GAIT ASSESSMENTS
DISTANCE (FEET): 180
GAIT LEVEL OF ASSIST: SUPERVISED

## 2021-01-01 ASSESSMENT — PAIN DESCRIPTION - PAIN TYPE
TYPE: ACUTE PAIN
TYPE: ACUTE PAIN

## 2021-01-01 NOTE — PROGRESS NOTES
Chronic, controlled.  Latest blood pressure and vitals reviewed-   Temp:  [97.6 °F (36.4 °C)-98.2 °F (36.8 °C)]   Pulse:  []   Resp:  [16-20]   BP: ()/(53-68)   SpO2:  [96 %-100 %] .   Home meds for hypertension were reviewed and noted below. Hospital anti-hypertensive changes were made as shown below.  Hypertension Medications             metoprolol tartrate (LOPRESSOR) 25 MG tablet Take 0.5 tablets (12.5 mg total) by mouth 2 (two) times daily.        Will utilize p.r.n. blood pressure medication only if patient's blood pressure greater than  180/110 and she develops symptoms such as worsening chest pain or shortness of breath.   Hospital Medicine Daily Progress Note    Date of Service  1/1/2021    Chief Complaint  65 y.o. male admitted 12/30/2020 with shortness of breath    Hospital Course  65 y.o. male with history of hypertension, diabetes, hyperlipidemia who presented 12/30/2020 to [place] ED with complains of hypoxia and shortness of breath then transferred to [facility] as direct admit after patient was found to have COVID-19.  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  He stated that his wife is also sick with COVID-19.  Reported generalized myalgia, subjective fever, headaches and progressive shortness of breath.  Patient noted his oxygen level as low as 50% in [place] ED, improved with 5 to 6 L oxygen support.  Patient was transferred to [facility] for higher level care.  At time of my evaluation, his saturation greater than 92% on 5 L, alert awake with no apparent respiratory distress.  He was given Decadron 6 mg prior to transfer.  Will transfer patient to [facility] will close monitoring and possible discharge in a.m. with home oxygen.      Interval Problem Update  I evaluated and examined the patient at the bedside.  Labs and imaging were reviewed. Care plan was discussed with the patient and bedside nurse.    Patient reported he feels fine, however, his oxygen needs continue to increase to 8 L from 6L.  BL Rales per ausculation, change Lasix from p.o. to IV, consult ID for remdesivir.    Echo 1/1/21 unremarkable with normal EF of 65%, dilated ascending aorta 4.3 CM    CONT  Decadron    Diabetes on lantus and sliding scale      Consultants/Specialty      Code Status  Full Code    Disposition  TBD    Review of Systems  Review of Systems   Constitutional: Positive for malaise/fatigue. Negative for chills, diaphoresis and fever.   HENT: Negative for congestion, ear discharge, ear pain and sore throat.    Eyes: Negative for pain,  discharge and redness.   Respiratory: Positive for cough and shortness of breath. Negative for hemoptysis and wheezing.    Cardiovascular: Negative for chest pain, palpitations and leg swelling.   Gastrointestinal: Negative for abdominal pain, constipation, diarrhea, nausea and vomiting.   Genitourinary: Negative for dysuria, flank pain, frequency, hematuria and urgency.   Musculoskeletal: Negative for back pain, falls, joint pain and neck pain.   Skin: Negative for itching.   Neurological: Positive for weakness. Negative for dizziness, tremors, sensory change, speech change, focal weakness, loss of consciousness and headaches.   Psychiatric/Behavioral: Negative for depression, hallucinations and substance abuse. The patient is not nervous/anxious.         Physical Exam  Temp:  [36 °C (96.8 °F)-36.4 °C (97.6 °F)] 36.4 °C (97.6 °F)  Pulse:  [71-84] 84  Resp:  [16-20] 18  BP: (117-128)/(69-76) 117/73  SpO2:  [91 %-94 %] 91 %    Physical Exam  Constitutional:       General: He is not in acute distress.     Appearance: Normal appearance. He is obese. He is not ill-appearing or diaphoretic.   HENT:      Head: Normocephalic and atraumatic.      Nose: Nose normal.      Mouth/Throat:      Pharynx: Oropharynx is clear. No oropharyngeal exudate or posterior oropharyngeal erythema.   Eyes:      General:         Right eye: No discharge.         Left eye: No discharge.      Extraocular Movements: Extraocular movements intact.      Conjunctiva/sclera: Conjunctivae normal.      Pupils: Pupils are equal, round, and reactive to light.   Neck:      Musculoskeletal: Normal range of motion and neck supple. No neck rigidity or muscular tenderness.   Cardiovascular:      Rate and Rhythm: Normal rate and regular rhythm.      Pulses: Normal pulses.      Heart sounds: Normal heart sounds. No gallop.    Pulmonary:      Effort: Respiratory distress present.      Breath sounds: No stridor. Rales present. No wheezing or rhonchi.      Comments:  Decreased breathing sounds  Chest:      Chest wall: No tenderness.   Abdominal:      General: Bowel sounds are normal. There is no distension.      Palpations: Abdomen is soft.      Tenderness: There is no abdominal tenderness. There is no guarding or rebound.   Musculoskeletal: Normal range of motion.         General: No swelling, tenderness, deformity or signs of injury.      Right lower leg: No edema.      Left lower leg: No edema.   Neurological:      General: No focal deficit present.      Mental Status: He is alert and oriented to person, place, and time. Mental status is at baseline.      Sensory: No sensory deficit.      Motor: No weakness.      Gait: Gait normal.   Psychiatric:         Mood and Affect: Mood normal.         Behavior: Behavior normal.         Thought Content: Thought content normal.         Fluids    Intake/Output Summary (Last 24 hours) at 1/1/2021 1229  Last data filed at 1/1/2021 0300  Gross per 24 hour   Intake 120 ml   Output --   Net 120 ml       Laboratory  Recent Labs     12/31/20  0012 01/01/21  0926   WBC 7.4 13.1*   RBC 5.90 5.91   HEMOGLOBIN 16.8 16.8   HEMATOCRIT 55.5* 55.8*   MCV 94.1 94.4   MCH 28.5 28.4   MCHC 30.3* 30.1*   RDW 53.2* 52.6*   PLATELETCT 178 228   MPV 10.5 10.7     Recent Labs     12/31/20  0012 01/01/21  0926   SODIUM 139 138   POTASSIUM 4.2 4.4   CHLORIDE 103 100   CO2 27 28   GLUCOSE 205* 185*   BUN 13 22   CREATININE 0.74 0.78   CALCIUM 8.6 8.8                   Imaging  EC-ECHOCARDIOGRAM LTD W/O CONT   Final Result      DX-CHEST-PORTABLE (1 VIEW)   Final Result         Diffuse interstitial prominence and hazy peripheral opacities bilaterally, concerning for Covid 19 pneumonia      IR-US GUIDED PIV    (Results Pending)        Assessment/Plan  * Acute hypoxemic respiratory failure due to COVID-19 ([facility])  Assessment & Plan  Procalcitonin 0.06, proBNP 114, D-dimer 0.67, WBC 7.4  COVID-19 positive 12/30/20 at [place] ED    Decadron 6mg x10 doses  (12/30-1/8)  Procal WNL - no superimposed bacterial PNA  Currently on 5L oxygen - wean off as tolerated  No role for Remdesivir at this time - re-eval in AM  Self-proning encouraged    1/1: oxygen needs continue to increase to 8 L from 6L.  BL Rales per ausculation, change Lasix from p.o. to IV, consult ID for remdesivir.    Echo 1/1/2021 unremarkable with normal EF of 65%, dilated ascending aorta 4.3 CM    Acute respiratory failure with hypoxia (HCC)  Assessment & Plan  Secondary Covid  Minimally levated D-dimer defer LE doppler / CTA PE workup at this time  Diuresis as tolerated - Lasix 40mg PO daily x3 doses    Diabetes (HCC)  Assessment & Plan  Hold home metformin,    HbA1c 7.3  Lantus + ISS    Hypertension  Assessment & Plan  HOLD home amlodipine and losartan  Diurese with Lasix    Ascending aorta dilatation (HCC)  Assessment & Plan  Echo 1/1/21 unremarkable with normal EF of 65%, dilated ascending aorta 4.3 CM    -Asymptomatic, outpatient follow-up      GERD (gastroesophageal reflux disease)  Assessment & Plan  Omeprazole    MICHELLE (obstructive sleep apnea)  Assessment & Plan  No longer requires CPAP since uvulectomy and tonsillectomy    Class 2 obesity in adult  Assessment & Plan  Diet and lifestyle modification discussed    Hyperlipidemia  Assessment & Plan  Continue statin       VTE prophylaxis: Lovenox

## 2021-01-01 NOTE — ASSESSMENT & PLAN NOTE
Echo 1/1/21 unremarkable with normal EF of 65%, dilated ascending aorta 4.3 CM    -Asymptomatic, outpatient follow-up

## 2021-01-01 NOTE — THERAPY
Missed Therapy     Patient Name: Momo Gonzalez  Age:  65 y.o., Sex:  male  Medical Record #: [id number]  Today's Date: 1/1/2021    Discussed missed therapy with nursing staff.       01/01/21 1512   Interdisciplinary Plan of Care Collaboration   IDT Collaboration with  Nursing   Collaboration Comments OT sachi attempted. Pt declined stating that they just started him on medication and he doesn't want to get up. Explained to pt that we can bring IV pole along, but pt continued to politely refuse. Will attempt again as pt becomes agreeable to participate.

## 2021-01-01 NOTE — THERAPY
"Physical Therapy   Initial Evaluation     Patient Name: Momo Gonzalez  Age:  65 y.o., Sex:  male  Medical Record #: [id number]  Today's Date: 1/1/2021          Assessment  Patient is 65 y.o. male admitted for hypoxia and found to be COVID+ presenting to PT without mobility impairment, but very easily desaturates during activity. Pt found to be on 8L via nasal cannula (?) at 93%. He reported he has been walking to/from bathroom without 02 because \"I don't want to drag it around\". For educational purposes, PT removed pt's 02 while pt sat EOB. He desaturated to 80% within a minute. With replacement of 02, improved to 91% within 2mins. During gait with 8L, Sp02 dropped to 84% and pt required 3-4mins of standing rest to improve to 91%. Attempted thorough education regarding importance of wearing supplemental 02 as well as monitoring Sp02 while here and at home. Pt verbalized understanding but suspect that he will require reinforcement as he reported he felt his low saturations were keeping his COVID infection from \"getting too bad\". Pt does not require further PT intervention; certainly, question his insight into his disease process.       Plan    Recommend Physical Therapy for Evaluation only    DC Equipment Recommendations: None  Discharge Recommendations: Anticipate that the patient will have no further physical therapy needs after discharge from the hospital        Objective       01/01/21 0903   Prior Living Situation   Prior Services None   Housing / Facility 2 Story House   Steps In Home   (flight)   Equipment Owned None   Lives with - Patient's Self Care Capacity Spouse   Prior Level of Functional Mobility   Bed Mobility Independent   Transfer Status Independent   Ambulation Independent   Distance Ambulation (Feet)   (community)   Assistive Devices Used None   Stairs Independent   Gait Analysis   Gait Level Of Assist Supervised   Assistive Device None   Distance (Feet) 180   Comments Pt desaturated to 84% on 8L " requiring 2-3mins of standing rest to improve to 91%. Pt reports he has been managing stairs to/from bathroom without difficulty.    Bed Mobility    Supine to Sit Supervised   Scooting Supervised   Rolling Supervised   Functional Mobility   Sit to Stand Supervised   Bed, Chair, Wheelchair Transfer Supervised   Transfer Method Stand Step   Anticipated Discharge Equipment and Recommendations   DC Equipment Recommendations None   Discharge Recommendations Anticipate that the patient will have no further physical therapy needs after discharge from the hospital

## 2021-01-01 NOTE — CONSULTS
ID BRIEF NOTE     Reviewed patient's  chart and vitals.  He has had increasing oxygen requirements since arrival and now on 8 L nasal cannula.    COVID-19 pneumonia  Hypoxemia secondary to above  Leukocytosis likely secondary to steroid   Lymphopenia        Plan:      -Continue supportive care with oxygen supplementation, proning, judicious use of IV fluids  -Monitor inflammatory markers   -Monitor d-dimer, ppx anticoagulation recommended unless high risk for bleeding - low threshold to initiate work-up for VTE's-patient on Lovenox  -Agree with dexamethasone 6 mg daily for planned 10-day course- ongoing  -The patient meets [facility] criteria for Remdesivir - placed order with pharmacy for planned 5 day course.  If the patient improves and is ready for discharge then no need to keep in the hospital to complete course.  Okay to stop remdesivir and discharge,    -Please obtain daily CMP to evaluate kidney and liver function while on Remdesivir.  If significant declining renal function particularly if GFR <30 or ALT >10x normal then stop Remdesivir   -Monitor for any secondary bacterial infections including pneumonia.       ID will sign off.

## 2021-01-02 LAB
ALBUMIN SERPL BCP-MCNC: 3.5 G/DL (ref 3.2–4.9)
ALBUMIN/GLOB SERPL: 1 G/DL
ALP SERPL-CCNC: 77 U/L (ref 30–99)
ALT SERPL-CCNC: 44 U/L (ref 2–50)
ANION GAP SERPL CALC-SCNC: 6 MMOL/L (ref 7–16)
AST SERPL-CCNC: 24 U/L (ref 12–45)
BASOPHILS # BLD AUTO: 0.1 % (ref 0–1.8)
BASOPHILS # BLD: 0.01 K/UL (ref 0–0.12)
BILIRUB SERPL-MCNC: 0.5 MG/DL (ref 0.1–1.5)
BUN SERPL-MCNC: 20 MG/DL (ref 8–22)
CALCIUM SERPL-MCNC: 8.7 MG/DL (ref 8.5–10.5)
CHLORIDE SERPL-SCNC: 100 MMOL/L (ref 96–112)
CO2 SERPL-SCNC: 35 MMOL/L (ref 20–33)
CREAT SERPL-MCNC: 0.75 MG/DL (ref 0.5–1.4)
EOSINOPHIL # BLD AUTO: 0 K/UL (ref 0–0.51)
EOSINOPHIL NFR BLD: 0 % (ref 0–6.9)
ERYTHROCYTE [DISTWIDTH] IN BLOOD BY AUTOMATED COUNT: 54.4 FL (ref 35.9–50)
GLOBULIN SER CALC-MCNC: 3.6 G/DL (ref 1.9–3.5)
GLUCOSE BLD-MCNC: 120 MG/DL (ref 65–99)
GLUCOSE BLD-MCNC: 128 MG/DL (ref 65–99)
GLUCOSE BLD-MCNC: 149 MG/DL (ref 65–99)
GLUCOSE BLD-MCNC: 95 MG/DL (ref 65–99)
GLUCOSE SERPL-MCNC: 136 MG/DL (ref 65–99)
HCT VFR BLD AUTO: 54.2 % (ref 42–52)
HGB BLD-MCNC: 16.4 G/DL (ref 14–18)
IMM GRANULOCYTES # BLD AUTO: 0.06 K/UL (ref 0–0.11)
IMM GRANULOCYTES NFR BLD AUTO: 0.7 % (ref 0–0.9)
LYMPHOCYTES # BLD AUTO: 0.43 K/UL (ref 1–4.8)
LYMPHOCYTES NFR BLD: 5.2 % (ref 22–41)
MCH RBC QN AUTO: 29 PG (ref 27–33)
MCHC RBC AUTO-ENTMCNC: 30.3 G/DL (ref 33.7–35.3)
MCV RBC AUTO: 95.8 FL (ref 81.4–97.8)
MONOCYTES # BLD AUTO: 0.64 K/UL (ref 0–0.85)
MONOCYTES NFR BLD AUTO: 7.7 % (ref 0–13.4)
NEUTROPHILS # BLD AUTO: 7.14 K/UL (ref 1.82–7.42)
NEUTROPHILS NFR BLD: 86.3 % (ref 44–72)
NRBC # BLD AUTO: 0 K/UL
NRBC BLD-RTO: 0 /100 WBC
PLATELET # BLD AUTO: 215 K/UL (ref 164–446)
PMV BLD AUTO: 10.2 FL (ref 9–12.9)
POTASSIUM SERPL-SCNC: 4.3 MMOL/L (ref 3.6–5.5)
PROT SERPL-MCNC: 7.1 G/DL (ref 6–8.2)
RBC # BLD AUTO: 5.66 M/UL (ref 4.7–6.1)
SODIUM SERPL-SCNC: 141 MMOL/L (ref 135–145)
WBC # BLD AUTO: 8.3 K/UL (ref 4.8–10.8)

## 2021-01-02 PROCEDURE — 700102 HCHG RX REV CODE 250 W/ 637 OVERRIDE(OP): Performed by: STUDENT IN AN ORGANIZED HEALTH CARE EDUCATION/TRAINING PROGRAM

## 2021-01-02 PROCEDURE — 700101 HCHG RX REV CODE 250: Performed by: INTERNAL MEDICINE

## 2021-01-02 PROCEDURE — 770014 HCHG ROOM/CARE - WARD (150)

## 2021-01-02 PROCEDURE — 700111 HCHG RX REV CODE 636 W/ 250 OVERRIDE (IP): Performed by: STUDENT IN AN ORGANIZED HEALTH CARE EDUCATION/TRAINING PROGRAM

## 2021-01-02 PROCEDURE — A9270 NON-COVERED ITEM OR SERVICE: HCPCS | Performed by: STUDENT IN AN ORGANIZED HEALTH CARE EDUCATION/TRAINING PROGRAM

## 2021-01-02 PROCEDURE — 85025 COMPLETE CBC W/AUTO DIFF WBC: CPT

## 2021-01-02 PROCEDURE — 36415 COLL VENOUS BLD VENIPUNCTURE: CPT

## 2021-01-02 PROCEDURE — 99232 SBSQ HOSP IP/OBS MODERATE 35: CPT | Performed by: STUDENT IN AN ORGANIZED HEALTH CARE EDUCATION/TRAINING PROGRAM

## 2021-01-02 PROCEDURE — 82962 GLUCOSE BLOOD TEST: CPT | Mod: 91

## 2021-01-02 PROCEDURE — 80053 COMPREHEN METABOLIC PANEL: CPT

## 2021-01-02 PROCEDURE — 700105 HCHG RX REV CODE 258: Performed by: INTERNAL MEDICINE

## 2021-01-02 RX ORDER — FUROSEMIDE 10 MG/ML
40 INJECTION INTRAMUSCULAR; INTRAVENOUS
Status: DISCONTINUED | OUTPATIENT
Start: 2021-01-02 | End: 2021-01-02

## 2021-01-02 RX ORDER — FUROSEMIDE 20 MG/1
40 TABLET ORAL
Status: DISCONTINUED | OUTPATIENT
Start: 2021-01-02 | End: 2021-01-04

## 2021-01-02 RX ADMIN — ASPIRIN 81 MG CHEWABLE TABLET 81 MG: 81 TABLET CHEWABLE at 05:39

## 2021-01-02 RX ADMIN — FUROSEMIDE 40 MG: 20 TABLET ORAL at 09:03

## 2021-01-02 RX ADMIN — ACETAMINOPHEN 650 MG: 325 TABLET ORAL at 00:15

## 2021-01-02 RX ADMIN — FUROSEMIDE 40 MG: 20 TABLET ORAL at 15:49

## 2021-01-02 RX ADMIN — ENOXAPARIN SODIUM 40 MG: 40 INJECTION SUBCUTANEOUS at 05:40

## 2021-01-02 RX ADMIN — REMDESIVIR 100 MG: 100 INJECTION, POWDER, LYOPHILIZED, FOR SOLUTION INTRAVENOUS at 18:29

## 2021-01-02 RX ADMIN — DEXAMETHASONE 6 MG: 4 TABLET ORAL at 05:39

## 2021-01-02 ASSESSMENT — ENCOUNTER SYMPTOMS
SORE THROAT: 0
HEADACHES: 0
WEAKNESS: 1
SENSORY CHANGE: 0
CONSTIPATION: 0
WHEEZING: 0
ABDOMINAL PAIN: 0
SPEECH CHANGE: 0
FLANK PAIN: 0
DIAPHORESIS: 0
FOCAL WEAKNESS: 0
DEPRESSION: 0
HALLUCINATIONS: 0
DIARRHEA: 0
SHORTNESS OF BREATH: 1
LOSS OF CONSCIOUSNESS: 0
COUGH: 1
EYE DISCHARGE: 0
FEVER: 0
VOMITING: 0
NECK PAIN: 0
DIZZINESS: 0
NAUSEA: 0
PALPITATIONS: 0
BACK PAIN: 0
TREMORS: 0
EYE PAIN: 0
NERVOUS/ANXIOUS: 0
EYE REDNESS: 0
HEMOPTYSIS: 0
FALLS: 0
CHILLS: 0

## 2021-01-02 ASSESSMENT — PAIN DESCRIPTION - PAIN TYPE: TYPE: ACUTE PAIN

## 2021-01-02 ASSESSMENT — LIFESTYLE VARIABLES: SUBSTANCE_ABUSE: 0

## 2021-01-02 NOTE — PROGRESS NOTES
Hospital Medicine Daily Progress Note    Date of Service  1/2/2021    Chief Complaint  65 y.o. male admitted 12/30/2020 with shortness of breath    Hospital Course  65 y.o. male with history of hypertension, diabetes, hyperlipidemia who presented 12/30/2020 to [place] ED with complains of hypoxia and shortness of breath then transferred to [facility] as direct admit after patient was found to have COVID-19.  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  He stated that his wife is also sick with COVID-19.  Reported generalized myalgia, subjective fever, headaches and progressive shortness of breath.  Patient noted his oxygen level as low as 50% in [place] ED, improved with 5 to 6 L oxygen support.  Patient was transferred to [facility] for higher level care.  At time of my evaluation, his saturation greater than 92% on 5 L, alert awake with no apparent respiratory distress.  He was given Decadron 6 mg prior to transfer.  Will transfer patient to [facility] will close monitoring and possible discharge in a.m. with home oxygen.      Interval Problem Update  I evaluated and examined the patient at the bedside.  Labs and imaging were reviewed. Care plan was discussed with the patient and bedside nurse.    Patient reported his breathing better, his oxygen needs decreased to 3 L from 8 L yesterday.  Change to Lasix IV daily to p.o. twice daily.  Remdesivir started 1/    Ambulation tested today, 6 L on ambulation.  Home oxygen ordered.    Decadron day 3, remdesivir day 2, Lasix 40 mg bid x3, monitor CMP, CBC    Echo 1/1/21 unremarkable with normal EF of 65%, dilated ascending aorta 4.3 CM    Diabetes on lantus and sliding scale      Consultants/Specialty      Code Status  Full Code    Disposition  Home ,  home oxygen    Review of Systems  Review of Systems   Constitutional: Positive for malaise/fatigue. Negative for chills,  diaphoresis and fever.   HENT: Negative for congestion, ear discharge, ear pain and sore throat.    Eyes: Negative for pain, discharge and redness.   Respiratory: Positive for cough and shortness of breath. Negative for hemoptysis and wheezing.    Cardiovascular: Negative for chest pain, palpitations and leg swelling.   Gastrointestinal: Negative for abdominal pain, constipation, diarrhea, nausea and vomiting.   Genitourinary: Negative for dysuria, flank pain, frequency, hematuria and urgency.   Musculoskeletal: Negative for back pain, falls, joint pain and neck pain.   Skin: Negative for itching.   Neurological: Positive for weakness. Negative for dizziness, tremors, sensory change, speech change, focal weakness, loss of consciousness and headaches.   Psychiatric/Behavioral: Negative for depression, hallucinations and substance abuse. The patient is not nervous/anxious.         Physical Exam  Temp:  [35.8 °C (96.5 °F)-37.7 °C (99.8 °F)] 35.8 °C (96.5 °F)  Pulse:  [60-82] 81  Resp:  [16-22] 18  BP: (120-143)/(63-76) 120/63  SpO2:  [92 %-96 %] 92 %    Physical Exam  Constitutional:       General: He is not in acute distress.     Appearance: Normal appearance. He is obese. He is not ill-appearing or diaphoretic.   HENT:      Head: Normocephalic and atraumatic.      Nose: Nose normal.      Mouth/Throat:      Pharynx: Oropharynx is clear. No oropharyngeal exudate or posterior oropharyngeal erythema.   Eyes:      General:         Right eye: No discharge.         Left eye: No discharge.      Extraocular Movements: Extraocular movements intact.      Conjunctiva/sclera: Conjunctivae normal.      Pupils: Pupils are equal, round, and reactive to light.   Neck:      Musculoskeletal: Normal range of motion and neck supple. No neck rigidity or muscular tenderness.   Cardiovascular:      Rate and Rhythm: Normal rate and regular rhythm.      Pulses: Normal pulses.      Heart sounds: Normal heart sounds. No gallop.    Pulmonary:       Effort: Respiratory distress present.      Breath sounds: No stridor. Rales present. No wheezing or rhonchi.      Comments: Decreased breathing sounds  Chest:      Chest wall: No tenderness.   Abdominal:      General: Bowel sounds are normal. There is no distension.      Palpations: Abdomen is soft.      Tenderness: There is no abdominal tenderness. There is no guarding or rebound.   Musculoskeletal: Normal range of motion.         General: No swelling, tenderness, deformity or signs of injury.      Right lower leg: No edema.      Left lower leg: No edema.   Neurological:      General: No focal deficit present.      Mental Status: He is alert and oriented to person, place, and time. Mental status is at baseline.      Sensory: No sensory deficit.      Motor: No weakness.      Gait: Gait normal.   Psychiatric:         Mood and Affect: Mood normal.         Behavior: Behavior normal.         Thought Content: Thought content normal.         Fluids    Intake/Output Summary (Last 24 hours) at 1/2/2021 1053  Last data filed at 1/2/2021 0500  Gross per 24 hour   Intake --   Output 900 ml   Net -900 ml       Laboratory  Recent Labs     12/31/20  0012 01/01/21  0926   WBC 7.4 13.1*   RBC 5.90 5.91   HEMOGLOBIN 16.8 16.8   HEMATOCRIT 55.5* 55.8*   MCV 94.1 94.4   MCH 28.5 28.4   MCHC 30.3* 30.1*   RDW 53.2* 52.6*   PLATELETCT 178 228   MPV 10.5 10.7     Recent Labs     12/31/20  0012 01/01/21  0926   SODIUM 139 138   POTASSIUM 4.2 4.4   CHLORIDE 103 100   CO2 27 28   GLUCOSE 205* 185*   BUN 13 22   CREATININE 0.74 0.78   CALCIUM 8.6 8.8                   Imaging  EC-ECHOCARDIOGRAM LTD W/O CONT   Final Result      DX-CHEST-PORTABLE (1 VIEW)   Final Result         Diffuse interstitial prominence and hazy peripheral opacities bilaterally, concerning for Covid 19 pneumonia           Assessment/Plan  * Acute hypoxemic respiratory failure due to COVID-19 ([facility])  Assessment & Plan  Procalcitonin 0.06, proBNP 114, D-dimer 0.67, WBC  7.4  COVID-19 positive 12/30/20 at [place] ED  Echo 1/1/2021 unremarkable with normal EF of 65%, dilated ascending aorta 4.3 CM    Decadron 6mg x10 doses (12/30-1/8)  Self-proning encouraged  remdesivir 1/1- 1/5,   Lasix   monitor CMP, CBC        Acute respiratory failure with hypoxia (HCC)  Assessment & Plan  Secondary Covid  Minimally levated D-dimer defer LE doppler / CTA PE workup at this time  Diuresis as tolerated - Lasix 40mg PO daily x3 doses    Diabetes (HCC)  Assessment & Plan  Hold home metformin,    HbA1c 7.3  Lantus + ISS    Hypertension  Assessment & Plan  HOLD home amlodipine and losartan  Diurese with Lasix    Leukocytosis  Assessment & Plan  Procalcitonin 0.0 6 (12/31)  -Likely secondary to steroid use    Ascending aorta dilatation (HCC)  Assessment & Plan  Echo 1/1/21 unremarkable with normal EF of 65%, dilated ascending aorta 4.3 CM    -Asymptomatic, outpatient follow-up      GERD (gastroesophageal reflux disease)  Assessment & Plan  Omeprazole    MICHELLE (obstructive sleep apnea)  Assessment & Plan  No longer requires CPAP since uvulectomy and tonsillectomy    Class 2 obesity in adult  Assessment & Plan  Diet and lifestyle modification discussed    Hyperlipidemia  Assessment & Plan  Continue statin       VTE prophylaxis: Lovenox

## 2021-01-02 NOTE — FACE TO FACE
Face to Face Note  -  Durable Medical Equipment    Pam Corona M.D. - NPI: [id number]  I certify that this patient is under my care and that they had a durable medical equipment(DME)face to face encounter by myself that meets the physician DME face-to-face encounter requirements with this patient on:    Date of encounter:   Patient:                    MRN:                       YOB: 2023  Momo Gonzalez  [id number]  1955     The encounter with the patient was in whole, or in part, for the following medical condition, which is the primary reason for durable medical equipment:  Other - COVID-19 pneumonia    I certify that, based on my findings, the following durable medical equipment is medically necessary:  Oxygen.    HOME O2 Saturation Measurements:(Values must be present for Home Oxygen orders)  Room air sat at rest: 87  Room air sat with amb: 83  With liters of O2: 3, O2 sat at rest with O2: 90  With Liters of O2: 6, O2 sat with amb with O2 : 89  Is the patient mobile?: Yes    My Clinical findings support the need for the above equipment due to:  Hypoxia    Supporting Symptoms: COVID-19 pneumonia    If patient feels more short of breath, they can go up to 6 liters per minute and contact healthcare provider.

## 2021-01-02 NOTE — PROGRESS NOTES
Assessment completed. Pt A&Ox4.  Respirations even, unlabored on 3L n/c 2L baseline.  Pt reports pain.  POC discussed: Placement SNIF. Communication board updated. Bed in locked, lowest position.  Call light and belongings within reach.  Needs met, will continue to monitor.

## 2021-01-03 LAB
ALBUMIN SERPL BCP-MCNC: 3.6 G/DL (ref 3.2–4.9)
ALBUMIN/GLOB SERPL: 1 G/DL
ALP SERPL-CCNC: 81 U/L (ref 30–99)
ALT SERPL-CCNC: 51 U/L (ref 2–50)
ANION GAP SERPL CALC-SCNC: 9 MMOL/L (ref 7–16)
AST SERPL-CCNC: 29 U/L (ref 12–45)
BASOPHILS # BLD AUTO: 0.2 % (ref 0–1.8)
BASOPHILS # BLD: 0.01 K/UL (ref 0–0.12)
BILIRUB SERPL-MCNC: 0.5 MG/DL (ref 0.1–1.5)
BUN SERPL-MCNC: 21 MG/DL (ref 8–22)
CALCIUM SERPL-MCNC: 8.9 MG/DL (ref 8.5–10.5)
CHLORIDE SERPL-SCNC: 99 MMOL/L (ref 96–112)
CO2 SERPL-SCNC: 33 MMOL/L (ref 20–33)
CREAT SERPL-MCNC: 0.61 MG/DL (ref 0.5–1.4)
EOSINOPHIL # BLD AUTO: 0 K/UL (ref 0–0.51)
EOSINOPHIL NFR BLD: 0 % (ref 0–6.9)
ERYTHROCYTE [DISTWIDTH] IN BLOOD BY AUTOMATED COUNT: 51.8 FL (ref 35.9–50)
GLOBULIN SER CALC-MCNC: 3.7 G/DL (ref 1.9–3.5)
GLUCOSE BLD-MCNC: 165 MG/DL (ref 65–99)
GLUCOSE BLD-MCNC: 178 MG/DL (ref 65–99)
GLUCOSE BLD-MCNC: 183 MG/DL (ref 65–99)
GLUCOSE BLD-MCNC: 193 MG/DL (ref 65–99)
GLUCOSE BLD-MCNC: 89 MG/DL (ref 65–99)
GLUCOSE SERPL-MCNC: 181 MG/DL (ref 65–99)
HCT VFR BLD AUTO: 56.4 % (ref 42–52)
HGB BLD-MCNC: 17 G/DL (ref 14–18)
IMM GRANULOCYTES # BLD AUTO: 0.04 K/UL (ref 0–0.11)
IMM GRANULOCYTES NFR BLD AUTO: 0.6 % (ref 0–0.9)
LYMPHOCYTES # BLD AUTO: 0.43 K/UL (ref 1–4.8)
LYMPHOCYTES NFR BLD: 6.9 % (ref 22–41)
MCH RBC QN AUTO: 28.2 PG (ref 27–33)
MCHC RBC AUTO-ENTMCNC: 30.1 G/DL (ref 33.7–35.3)
MCV RBC AUTO: 93.7 FL (ref 81.4–97.8)
MONOCYTES # BLD AUTO: 0.52 K/UL (ref 0–0.85)
MONOCYTES NFR BLD AUTO: 8.3 % (ref 0–13.4)
NEUTROPHILS # BLD AUTO: 5.25 K/UL (ref 1.82–7.42)
NEUTROPHILS NFR BLD: 84 % (ref 44–72)
NRBC # BLD AUTO: 0 K/UL
NRBC BLD-RTO: 0 /100 WBC
PLATELET # BLD AUTO: 246 K/UL (ref 164–446)
PMV BLD AUTO: 10.7 FL (ref 9–12.9)
POTASSIUM SERPL-SCNC: 3.9 MMOL/L (ref 3.6–5.5)
PROT SERPL-MCNC: 7.3 G/DL (ref 6–8.2)
RBC # BLD AUTO: 6.02 M/UL (ref 4.7–6.1)
SODIUM SERPL-SCNC: 141 MMOL/L (ref 135–145)
WBC # BLD AUTO: 6.3 K/UL (ref 4.8–10.8)

## 2021-01-03 PROCEDURE — 99232 SBSQ HOSP IP/OBS MODERATE 35: CPT | Performed by: STUDENT IN AN ORGANIZED HEALTH CARE EDUCATION/TRAINING PROGRAM

## 2021-01-03 PROCEDURE — 700101 HCHG RX REV CODE 250: Performed by: INTERNAL MEDICINE

## 2021-01-03 PROCEDURE — 700102 HCHG RX REV CODE 250 W/ 637 OVERRIDE(OP): Performed by: STUDENT IN AN ORGANIZED HEALTH CARE EDUCATION/TRAINING PROGRAM

## 2021-01-03 PROCEDURE — A9270 NON-COVERED ITEM OR SERVICE: HCPCS | Performed by: STUDENT IN AN ORGANIZED HEALTH CARE EDUCATION/TRAINING PROGRAM

## 2021-01-03 PROCEDURE — 82962 GLUCOSE BLOOD TEST: CPT | Mod: 91

## 2021-01-03 PROCEDURE — 94760 N-INVAS EAR/PLS OXIMETRY 1: CPT

## 2021-01-03 PROCEDURE — 770014 HCHG ROOM/CARE - WARD (150)

## 2021-01-03 PROCEDURE — 36415 COLL VENOUS BLD VENIPUNCTURE: CPT

## 2021-01-03 PROCEDURE — 700111 HCHG RX REV CODE 636 W/ 250 OVERRIDE (IP): Performed by: STUDENT IN AN ORGANIZED HEALTH CARE EDUCATION/TRAINING PROGRAM

## 2021-01-03 PROCEDURE — 700105 HCHG RX REV CODE 258: Performed by: INTERNAL MEDICINE

## 2021-01-03 PROCEDURE — 85025 COMPLETE CBC W/AUTO DIFF WBC: CPT

## 2021-01-03 PROCEDURE — 80053 COMPREHEN METABOLIC PANEL: CPT

## 2021-01-03 RX ORDER — FAMOTIDINE 10 MG
10 TABLET ORAL DAILY
Status: DISCONTINUED | OUTPATIENT
Start: 2021-01-03 | End: 2021-01-06 | Stop reason: HOSPADM

## 2021-01-03 RX ADMIN — DEXAMETHASONE 6 MG: 4 TABLET ORAL at 06:12

## 2021-01-03 RX ADMIN — ENOXAPARIN SODIUM 40 MG: 40 INJECTION SUBCUTANEOUS at 06:12

## 2021-01-03 RX ADMIN — ASPIRIN 81 MG CHEWABLE TABLET 81 MG: 81 TABLET CHEWABLE at 06:12

## 2021-01-03 RX ADMIN — FUROSEMIDE 40 MG: 20 TABLET ORAL at 17:46

## 2021-01-03 RX ADMIN — FUROSEMIDE 40 MG: 20 TABLET ORAL at 06:13

## 2021-01-03 RX ADMIN — REMDESIVIR 100 MG: 100 INJECTION, POWDER, LYOPHILIZED, FOR SOLUTION INTRAVENOUS at 17:36

## 2021-01-03 RX ADMIN — FAMOTIDINE 10 MG: 10 TABLET, FILM COATED ORAL at 11:07

## 2021-01-03 ASSESSMENT — ENCOUNTER SYMPTOMS
TREMORS: 0
BACK PAIN: 0
SPEECH CHANGE: 0
HALLUCINATIONS: 0
FALLS: 0
NERVOUS/ANXIOUS: 0
SENSORY CHANGE: 0
EYE PAIN: 0
NAUSEA: 0
DEPRESSION: 0
CHILLS: 0
EYE DISCHARGE: 0
VOMITING: 0
FEVER: 0
DIARRHEA: 0
PALPITATIONS: 0
SHORTNESS OF BREATH: 1
WHEEZING: 0
EYE REDNESS: 0
NECK PAIN: 0
DIAPHORESIS: 0
FOCAL WEAKNESS: 0
FLANK PAIN: 0
SORE THROAT: 0
COUGH: 1
CONSTIPATION: 0
HEMOPTYSIS: 0
HEADACHES: 0
WEAKNESS: 1
LOSS OF CONSCIOUSNESS: 0
DIZZINESS: 0
ABDOMINAL PAIN: 0

## 2021-01-03 ASSESSMENT — PAIN DESCRIPTION - PAIN TYPE: TYPE: ACUTE PAIN

## 2021-01-03 ASSESSMENT — LIFESTYLE VARIABLES: SUBSTANCE_ABUSE: 0

## 2021-01-03 NOTE — PROGRESS NOTES
Report received from ARIS Murillo.  Patient sitting on the side of the bed.  POC gone over with pt and all questions answered.  Patient A & O  X 4.  No apparent signs of distress.  Safety precautions in place.  Patient educated to call for assistance.  Will continue to monitor.

## 2021-01-03 NOTE — DISCHARGE PLANNING
Care Transition Team Assessment    SW noted Pt's new home O2 order.  SW called Pt on his cell phone to discuss discharge plan.  Pt states that his wife is currently at their second home in [place], [address], [place], [place].  Pt shared that his home is 2 stories so he believes that when he discharges that he will need two concentrators, one for each floor of the home. Pt shared that he does not feel ready to discharge and believes that he will be here for another 5 days and he would like to talk to the MD about [place] with his O2 needs.  DME choice was discussed Pt agreeable with company that takes his insurance and goes to [facility].  [facility] chosen.  Completed choice form faxed to [facility] for referral follow up.      Information Source  Orientation : Oriented x 4  Information Given By: Patient  Informant's Name: Momo  Who is responsible for making decisions for patient? : Patient    Readmission Evaluation  Is this a readmission?: No    Elopement Risk  Legal Hold: No  Ambulatory or Self Mobile in Wheelchair: No-Not an Elopement Risk    Interdisciplinary Discharge Planning  Lives with - Patient's Self Care Capacity: Spouse  Patient or legal guardian wants to designate a caregiver: No  Housing / Facility: 2 Story House  Prior Services: None    Discharge Preparedness  What is your plan after discharge?: Home with help  What are your discharge supports?: Spouse  Prior Functional Level: Independent with Activities of Daily Living    Functional Assesment  Prior Functional Level: Independent with Activities of Daily Living    Finances  Financial Barriers to Discharge: No  Prescription Coverage: Yes    Vision / Hearing Impairment  Vision Impairment : No  Hearing Impairment : No    Domestic Abuse  Have you ever been the victim of abuse or violence?: No  Physical Abuse or Sexual Abuse: No  Verbal Abuse or Emotional Abuse: No  Possible Abuse/Neglect Reported to:: Not Applicable    Psychological  Assessment  History of Substance Abuse: None  History of Psychiatric Problems: No    Discharge Risks or Barriers  Discharge risks or barriers?: No    Anticipated Discharge Information  Discharge Disposition: Still a Patient (30)

## 2021-01-03 NOTE — PROGRESS NOTES
Hospital Medicine Daily Progress Note    Date of Service  1/3/2021    Chief Complaint  65 y.o. male admitted 12/30/2020 with shortness of breath    Hospital Course  65 y.o. male with history of hypertension, diabetes, hyperlipidemia who presented 12/30/2020 to [place] ED with complains of hypoxia and shortness of breath then transferred to [facility] as direct admit after patient was found to have COVID-19.  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  He stated that his wife is also sick with COVID-19.  Reported generalized myalgia, subjective fever, headaches and progressive shortness of breath.  Patient noted his oxygen level as low as 50% in [place] ED, improved with 5 to 6 L oxygen support.  Patient was transferred to [facility] for higher level care.  At time of my evaluation, his saturation greater than 92% on 5 L, alert awake with no apparent respiratory distress.  He was given Decadron 6 mg prior to transfer.  Will transfer patient to [facility] will close monitoring and possible discharge in a.m. with home oxygen.      Interval Problem Update  I evaluated and examined the patient at the bedside.  Labs and imaging were reviewed. Care plan was discussed with the patient and bedside nurse.    Patient reported his breathing better, his oxygen needs decreased to 3 L from 8 L yesterday.  Change to Lasix IV daily to p.o. twice daily.  Remdesivir started 1/1    Ambulation tested today, 6 L on ambulation.  Home oxygen ordered. Will recheck ambulation tomorrow    Decadron day 4, remdesivir day 3, Lasix 40 mg bid x3, monitor CMP, CBC    Echo 1/1/21 unremarkable with normal EF of 65%, dilated ascending aorta 4.3 CM    Diabetes on lantus and sliding scale      Consultants/Specialty      Code Status  Full Code    Disposition  Home ,  home oxygen    Review of Systems  Review of Systems   Constitutional: Positive for  malaise/fatigue. Negative for chills, diaphoresis and fever.   HENT: Negative for congestion, ear discharge, ear pain and sore throat.    Eyes: Negative for pain, discharge and redness.   Respiratory: Positive for cough and shortness of breath. Negative for hemoptysis and wheezing.    Cardiovascular: Negative for chest pain, palpitations and leg swelling.   Gastrointestinal: Negative for abdominal pain, constipation, diarrhea, nausea and vomiting.   Genitourinary: Negative for dysuria, flank pain, frequency, hematuria and urgency.   Musculoskeletal: Negative for back pain, falls, joint pain and neck pain.   Skin: Negative for itching.   Neurological: Positive for weakness. Negative for dizziness, tremors, sensory change, speech change, focal weakness, loss of consciousness and headaches.   Psychiatric/Behavioral: Negative for depression, hallucinations and substance abuse. The patient is not nervous/anxious.         Physical Exam  Temp:  [36 °C (96.8 °F)-36.6 °C (97.8 °F)] 36.5 °C (97.7 °F)  Pulse:  [67-83] 83  Resp:  [16-20] 18  BP: (119-137)/(68-86) 132/85  SpO2:  [90 %-95 %] 90 %    Physical Exam  Constitutional:       General: He is not in acute distress.     Appearance: Normal appearance. He is obese. He is not ill-appearing or diaphoretic.   HENT:      Head: Normocephalic and atraumatic.      Nose: Nose normal.      Mouth/Throat:      Pharynx: Oropharynx is clear. No oropharyngeal exudate or posterior oropharyngeal erythema.   Eyes:      General:         Right eye: No discharge.         Left eye: No discharge.      Extraocular Movements: Extraocular movements intact.      Conjunctiva/sclera: Conjunctivae normal.      Pupils: Pupils are equal, round, and reactive to light.   Neck:      Musculoskeletal: Normal range of motion and neck supple. No neck rigidity or muscular tenderness.   Cardiovascular:      Rate and Rhythm: Normal rate and regular rhythm.      Pulses: Normal pulses.      Heart sounds: Normal heart  sounds. No gallop.    Pulmonary:      Effort: Respiratory distress present.      Breath sounds: No stridor. Rales present. No wheezing or rhonchi.      Comments: Decreased breathing sounds  Chest:      Chest wall: No tenderness.   Abdominal:      General: Bowel sounds are normal. There is no distension.      Palpations: Abdomen is soft.      Tenderness: There is no abdominal tenderness. There is no guarding or rebound.   Musculoskeletal: Normal range of motion.         General: No swelling, tenderness, deformity or signs of injury.      Right lower leg: No edema.      Left lower leg: No edema.   Neurological:      General: No focal deficit present.      Mental Status: He is alert and oriented to person, place, and time. Mental status is at baseline.      Sensory: No sensory deficit.      Motor: No weakness.      Gait: Gait normal.   Psychiatric:         Mood and Affect: Mood normal.         Behavior: Behavior normal.         Thought Content: Thought content normal.         Fluids    Intake/Output Summary (Last 24 hours) at 1/3/2021 1641  Last data filed at 1/3/2021 0746  Gross per 24 hour   Intake --   Output 750 ml   Net -750 ml       Laboratory  Recent Labs     01/01/21  0926 01/02/21  1127 01/03/21  1207   WBC 13.1* 8.3 6.3   RBC 5.91 5.66 6.02   HEMOGLOBIN 16.8 16.4 17.0   HEMATOCRIT 55.8* 54.2* 56.4*   MCV 94.4 95.8 93.7   MCH 28.4 29.0 28.2   MCHC 30.1* 30.3* 30.1*   RDW 52.6* 54.4* 51.8*   PLATELETCT 228 215 246   MPV 10.7 10.2 10.7     Recent Labs     01/01/21  0926 01/02/21  1127 01/03/21  1207   SODIUM 138 141 141   POTASSIUM 4.4 4.3 3.9   CHLORIDE 100 100 99   CO2 28 35* 33   GLUCOSE 185* 136* 181*   BUN 22 20 21   CREATININE 0.78 0.75 0.61   CALCIUM 8.8 8.7 8.9                   Imaging  EC-ECHOCARDIOGRAM LTD W/O CONT   Final Result      DX-CHEST-PORTABLE (1 VIEW)   Final Result         Diffuse interstitial prominence and hazy peripheral opacities bilaterally, concerning for Covid 19 pneumonia            Assessment/Plan  * Acute hypoxemic respiratory failure due to COVID-19 (HCC)  Assessment & Plan  Procalcitonin 0.06, proBNP 114, D-dimer 0.67, WBC 7.4  COVID-19 positive 12/30/20 at [place] ED  Echo 1/1/2021 unremarkable with normal EF of 65%, dilated ascending aorta 4.3 CM    Decadron 6mg x10 doses (12/30-1/8)  Self-proning encouraged  remdesivir 1/1- 1/5,   Lasix   monitor CMP, CBC        Acute respiratory failure with hypoxia (HCC)  Assessment & Plan  Secondary Covid  Minimally levated D-dimer defer LE doppler / CTA PE workup at this time  Diuresis as tolerated - Lasix 40mg PO daily x3 doses    Diabetes (HCC)  Assessment & Plan  Hold home metformin,    HbA1c 7.3  Lantus + ISS    Hypertension  Assessment & Plan  HOLD home amlodipine and losartan  Diurese with Lasix    Leukocytosis  Assessment & Plan  Procalcitonin 0.0 6 (12/31)  -Likely secondary to steroid use    Ascending aorta dilatation (HCC)  Assessment & Plan  Echo 1/1/21 unremarkable with normal EF of 65%, dilated ascending aorta 4.3 CM    -Asymptomatic, outpatient follow-up      GERD (gastroesophageal reflux disease)  Assessment & Plan  Omeprazole    MICHELLE (obstructive sleep apnea)  Assessment & Plan  No longer requires CPAP since uvulectomy and tonsillectomy    Class 2 obesity in adult  Assessment & Plan  Diet and lifestyle modification discussed    Hyperlipidemia  Assessment & Plan  Continue statin       VTE prophylaxis: Lovenox

## 2021-01-03 NOTE — DISCHARGE PLANNING
Anticipated Discharge Disposition: home in [place] w/ [facility] O2    Action: per [facility], O2 tank hasn't been delivered by [facility]. Requested cca to follow up w/ [facility]. Home O2 concentrator delivery instruction entered in AVS. Per Dr. Corona, pt to stay one more day for Remdesivir    Barriers to Discharge: TBD    Plan: TBD

## 2021-01-03 NOTE — PROGRESS NOTES
Assessment completed. Pt A&Ox4. Respirations are even and unlabored on 3L n/c, sating 90%. Pt denies pain at this time. Reports heartburn, updated Dr. Corona. Monitors applied, VS stable, call light and belongings within reach. POC updated (discharge). Communication board updated. Needs met.

## 2021-01-03 NOTE — DISCHARGE PLANNING
Received Choice form at [phone]  Agency/Facility Name: [facility]  Referral sent per Choice form @ [phone]      [facility] Call to advice that patient will stay at [facility] and would like to pay for second concenctrator.

## 2021-01-04 LAB
ALBUMIN SERPL BCP-MCNC: 3.1 G/DL (ref 3.2–4.9)
ALBUMIN/GLOB SERPL: 0.9 G/DL
ALP SERPL-CCNC: 77 U/L (ref 30–99)
ALT SERPL-CCNC: 45 U/L (ref 2–50)
ANION GAP SERPL CALC-SCNC: 5 MMOL/L (ref 7–16)
AST SERPL-CCNC: 28 U/L (ref 12–45)
BASOPHILS # BLD AUTO: 0 % (ref 0–1.8)
BASOPHILS # BLD: 0 K/UL (ref 0–0.12)
BILIRUB SERPL-MCNC: 0.6 MG/DL (ref 0.1–1.5)
BUN SERPL-MCNC: 20 MG/DL (ref 8–22)
CALCIUM SERPL-MCNC: 8.7 MG/DL (ref 8.5–10.5)
CHLORIDE SERPL-SCNC: 96 MMOL/L (ref 96–112)
CO2 SERPL-SCNC: 34 MMOL/L (ref 20–33)
CREAT SERPL-MCNC: 0.66 MG/DL (ref 0.5–1.4)
EOSINOPHIL # BLD AUTO: 0 K/UL (ref 0–0.51)
EOSINOPHIL NFR BLD: 0 % (ref 0–6.9)
ERYTHROCYTE [DISTWIDTH] IN BLOOD BY AUTOMATED COUNT: 50.5 FL (ref 35.9–50)
GLOBULIN SER CALC-MCNC: 3.5 G/DL (ref 1.9–3.5)
GLUCOSE BLD-MCNC: 103 MG/DL (ref 65–99)
GLUCOSE BLD-MCNC: 140 MG/DL (ref 65–99)
GLUCOSE BLD-MCNC: 178 MG/DL (ref 65–99)
GLUCOSE BLD-MCNC: 199 MG/DL (ref 65–99)
GLUCOSE SERPL-MCNC: 220 MG/DL (ref 65–99)
HCT VFR BLD AUTO: 54.2 % (ref 42–52)
HGB BLD-MCNC: 16.6 G/DL (ref 14–18)
IMM GRANULOCYTES # BLD AUTO: 0.04 K/UL (ref 0–0.11)
IMM GRANULOCYTES NFR BLD AUTO: 0.7 % (ref 0–0.9)
LYMPHOCYTES # BLD AUTO: 0.48 K/UL (ref 1–4.8)
LYMPHOCYTES NFR BLD: 8.6 % (ref 22–41)
MCH RBC QN AUTO: 28.5 PG (ref 27–33)
MCHC RBC AUTO-ENTMCNC: 30.6 G/DL (ref 33.7–35.3)
MCV RBC AUTO: 93 FL (ref 81.4–97.8)
MONOCYTES # BLD AUTO: 0.37 K/UL (ref 0–0.85)
MONOCYTES NFR BLD AUTO: 6.6 % (ref 0–13.4)
NEUTROPHILS # BLD AUTO: 4.69 K/UL (ref 1.82–7.42)
NEUTROPHILS NFR BLD: 84.1 % (ref 44–72)
NRBC # BLD AUTO: 0 K/UL
NRBC BLD-RTO: 0 /100 WBC
PLATELET # BLD AUTO: 252 K/UL (ref 164–446)
PMV BLD AUTO: 10.5 FL (ref 9–12.9)
POTASSIUM SERPL-SCNC: 3.7 MMOL/L (ref 3.6–5.5)
PROT SERPL-MCNC: 6.6 G/DL (ref 6–8.2)
RBC # BLD AUTO: 5.83 M/UL (ref 4.7–6.1)
SODIUM SERPL-SCNC: 135 MMOL/L (ref 135–145)
WBC # BLD AUTO: 5.6 K/UL (ref 4.8–10.8)

## 2021-01-04 PROCEDURE — 97166 OT EVAL MOD COMPLEX 45 MIN: CPT

## 2021-01-04 PROCEDURE — 99232 SBSQ HOSP IP/OBS MODERATE 35: CPT | Performed by: STUDENT IN AN ORGANIZED HEALTH CARE EDUCATION/TRAINING PROGRAM

## 2021-01-04 PROCEDURE — 700102 HCHG RX REV CODE 250 W/ 637 OVERRIDE(OP): Performed by: STUDENT IN AN ORGANIZED HEALTH CARE EDUCATION/TRAINING PROGRAM

## 2021-01-04 PROCEDURE — 700111 HCHG RX REV CODE 636 W/ 250 OVERRIDE (IP): Performed by: STUDENT IN AN ORGANIZED HEALTH CARE EDUCATION/TRAINING PROGRAM

## 2021-01-04 PROCEDURE — 94760 N-INVAS EAR/PLS OXIMETRY 1: CPT

## 2021-01-04 PROCEDURE — A9270 NON-COVERED ITEM OR SERVICE: HCPCS | Performed by: STUDENT IN AN ORGANIZED HEALTH CARE EDUCATION/TRAINING PROGRAM

## 2021-01-04 PROCEDURE — 700101 HCHG RX REV CODE 250: Performed by: INTERNAL MEDICINE

## 2021-01-04 PROCEDURE — 770014 HCHG ROOM/CARE - WARD (150)

## 2021-01-04 PROCEDURE — 85025 COMPLETE CBC W/AUTO DIFF WBC: CPT

## 2021-01-04 PROCEDURE — 36415 COLL VENOUS BLD VENIPUNCTURE: CPT

## 2021-01-04 PROCEDURE — 700105 HCHG RX REV CODE 258: Performed by: INTERNAL MEDICINE

## 2021-01-04 PROCEDURE — 80053 COMPREHEN METABOLIC PANEL: CPT

## 2021-01-04 PROCEDURE — 82962 GLUCOSE BLOOD TEST: CPT | Mod: 91

## 2021-01-04 RX ORDER — CALCIUM CARBONATE 500 MG/1
500 TABLET, CHEWABLE ORAL DAILY
Status: DISCONTINUED | OUTPATIENT
Start: 2021-01-04 | End: 2021-01-04 | Stop reason: CLARIF

## 2021-01-04 RX ORDER — FUROSEMIDE 20 MG/1
40 TABLET ORAL
Status: DISCONTINUED | OUTPATIENT
Start: 2021-01-05 | End: 2021-01-06 | Stop reason: HOSPADM

## 2021-01-04 RX ORDER — CALCIUM CARBONATE 500 MG/1
500 TABLET, CHEWABLE ORAL ONCE
Status: COMPLETED | OUTPATIENT
Start: 2021-01-04 | End: 2021-01-04

## 2021-01-04 RX ADMIN — ENOXAPARIN SODIUM 40 MG: 40 INJECTION SUBCUTANEOUS at 05:08

## 2021-01-04 RX ADMIN — REMDESIVIR 100 MG: 100 INJECTION, POWDER, LYOPHILIZED, FOR SOLUTION INTRAVENOUS at 17:23

## 2021-01-04 RX ADMIN — FAMOTIDINE 10 MG: 10 TABLET, FILM COATED ORAL at 05:08

## 2021-01-04 RX ADMIN — ASPIRIN 81 MG CHEWABLE TABLET 81 MG: 81 TABLET CHEWABLE at 05:08

## 2021-01-04 RX ADMIN — ANTACID TABLETS 500 MG: 500 TABLET, CHEWABLE ORAL at 02:35

## 2021-01-04 RX ADMIN — DEXAMETHASONE 6 MG: 4 TABLET ORAL at 05:08

## 2021-01-04 RX ADMIN — FUROSEMIDE 40 MG: 20 TABLET ORAL at 05:08

## 2021-01-04 ASSESSMENT — ENCOUNTER SYMPTOMS
NECK PAIN: 0
LOSS OF CONSCIOUSNESS: 0
FOCAL WEAKNESS: 0
SHORTNESS OF BREATH: 1
PALPITATIONS: 0
SENSORY CHANGE: 0
COUGH: 1
DEPRESSION: 0
EYE DISCHARGE: 0
WEAKNESS: 1
DIZZINESS: 0
EYE PAIN: 0
WHEEZING: 0
HEADACHES: 0
CHILLS: 0
ABDOMINAL PAIN: 0
SPEECH CHANGE: 0
DIARRHEA: 0
BACK PAIN: 0
NAUSEA: 0
SORE THROAT: 0
EYE REDNESS: 0
FEVER: 0
NERVOUS/ANXIOUS: 0
VOMITING: 0
FALLS: 0
TREMORS: 0
HEMOPTYSIS: 0
CONSTIPATION: 0
HALLUCINATIONS: 0
DIAPHORESIS: 0
FLANK PAIN: 0

## 2021-01-04 ASSESSMENT — ACTIVITIES OF DAILY LIVING (ADL): TOILETING: INDEPENDENT

## 2021-01-04 ASSESSMENT — COGNITIVE AND FUNCTIONAL STATUS - GENERAL
SUGGESTED CMS G CODE MODIFIER DAILY ACTIVITY: CH
DAILY ACTIVITIY SCORE: 24

## 2021-01-04 ASSESSMENT — LIFESTYLE VARIABLES: SUBSTANCE_ABUSE: 0

## 2021-01-04 NOTE — PROGRESS NOTES
Assessment completed. Pt A&Ox4. Respirations are even and unlabored on 3Ln/c, sating 87%-90% while eating. Pt denies pain at this time. Monitors applied, VS stable, call light and belongings within reach. POC updated (oxygen, possible d/c). Communication board updated. Needs met.

## 2021-01-04 NOTE — THERAPY
"Occupational Therapy   Initial Evaluation     Patient Name: Momo Gonzalez  Age:  65 y.o., Sex:  male  Medical Record #: [id number]  Today's Date: 1/4/2021     Precautions: (P) Fall Risk  Comments: (P) desaturates easily     Assessment  Patient is 65 y.o. male admitted for SOB. PMHX: HTN, DM, and hyperlipidemia. This admission pt is dx w/acute hypoxemic respiratory failure d/t covid 19, and hypoxia. Pt appears to be near his functional baseline for ADL's participation. Pt remains on O2 and was noted to desaturate during activity. Pt was educated on energy conservation strategies as well as importance of monitoring and maintaining appropriate O2 levels, as pt reports he takes off his O2 to go to the bathroom. Anticipate no further OT needs, but recommend HH for nsg to assist w/maximizaing safety w/home O2 use, especially [place]     Plan  Recommend Occupational Therapy for Evaluation only     DC Equipment Recommendations:  None  Discharge Recommendations:  Anticipate that the patient will have no further occupational therapy needs after discharge from the hospital(HH for nsg to assist w/O2 managment )     Subjective  \"I don't really notice any difference so why where the O2 to the bathroom\"      Objective   01/04/21 1455   Prior Living Situation   Prior Services None   Housing / Facility 2 Story House   Steps In Home   (flight )   Bathroom Set up Walk In Shower   Equipment Owned None   Lives with - Patient's Self Care Capacity Spouse   Comments pt reports SO is also currently ill but doing ok and thinks she can assist as needed    Prior Level of ADL Function   Self Feeding Independent   Grooming / Hygiene Independent   Bathing Independent   Dressing Independent   Toileting Independent   Prior Level of IADL Function   Medication Management Independent   Laundry Independent   Kitchen Mobility Independent   Finances Independent   Home Management Independent   Shopping Independent   Prior Level Of " Mobility Independent Without Device in Community   Driving / Transportation Driving Independent   Occupation (Pre-Hospital Vocational) Retired Due To Age   Precautions   Precautions Fall Risk   Comments desaturates easily    Pain 0 - 10 Group   Therapist Pain Assessment Nurse Notified;0   Cognition    Cognition / Consciousness X   Level of Consciousness Alert   Comments pleasant and cooperative, but lacking insight into O2 managment and current issues does not correlate O2 needs    Passive ROM Upper Body   Passive ROM Upper Body WDL   Active ROM Upper Body   Active ROM Upper Body  WDL   Strength Upper Body   Upper Body Strength  WDL   Sensation Upper Body   Upper Extremity Sensation  WDL   Upper Body Muscle Tone   Upper Body Muscle Tone  WDL   Neurological Concerns   Neurological Concerns No   Coordination Upper Body   Coordination WDL   Balance Assessment   Sitting Balance (Static) Good   Sitting Balance (Dynamic) Good   Standing Balance (Static) Good   Standing Balance (Dynamic) Good   Weight Shift Sitting Good   Weight Shift Standing Good   Comments pushing O2 tank    Bed Mobility    Comments up EOB and remained up EOB    ADL Assessment   Grooming Supervision;Standing   Upper Body Dressing Supervision   Lower Body Dressing Supervision   Toileting Supervision   Comments using toilet on unit    Functional Mobility   Sit to Stand Supervised   Bed, Chair, Wheelchair Transfer Supervised   Toilet Transfers Supervised   Mobility walking in room and around unit no AD    Visual Perception   Visual Perception  Not Tested   Edema / Skin Assessment   Edema / Skin  Not Assessed   Activity Tolerance   Comments no c/o pain or fatigue but O2 monitor showing 82-85% w/activity    Education Group   Role of Occupational Therapist Patient Response Patient;Acceptance;Explanation   Energy Conservation Patient Response Patient;Acceptance;Explanation   Problem List   Problem List None   Anticipated Discharge Equipment and Recommendations    DC Equipment Recommendations None   Discharge Recommendations Anticipate that the patient will have no further occupational therapy needs after discharge from the hospital  (HH for nsg to assist w/O2 managment )   Interdisciplinary Plan of Care Collaboration   IDT Collaboration with  Nursing   Patient Position at End of Therapy In Bed;Call Light within Reach;Tray Table within Reach;Phone within Reach   Collaboration Comments RN aware of OT eval and pts efforts    Session Information   Date / Session Number  1/4 #1 eval only    Priority 0

## 2021-01-04 NOTE — PROGRESS NOTES
Hospital Medicine Daily Progress Note    Date of Service  1/4/2021    Chief Complaint  65 y.o. male admitted 12/30/2020 with shortness of breath    Hospital Course  65 y.o. male with history of hypertension, diabetes, hyperlipidemia who presented 12/30/2020 to [place] ED with complains of hypoxia and shortness of breath then transferred to [facility] as direct admit after patient was found to have COVID-19.  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  He stated that his wife is also sick with COVID-19.  Reported generalized myalgia, subjective fever, headaches and progressive shortness of breath.  Patient noted his oxygen level as low as 50% in [place] ED, improved with 5 to 6 L oxygen support.  Patient was transferred to [facility] for higher level care.  At time of my evaluation, his saturation greater than 92% on 5 L, alert awake with no apparent respiratory distress.  He was given Decadron 6 mg prior to transfer.  Will transfer patient to [facility] will close monitoring and possible discharge in a.m. with home oxygen.      Interval Problem Update  I evaluated and examined the patient at the bedside.  Labs and imaging were reviewed. Care plan was discussed with the patient and bedside nurse.    Patient reported his breathing better, his oxygen improved and stays stable at 3L NC.     Decadron 12/30-1/8, remdesivir day 1/1-1/5, Lasix 40 mg daily, monitor CMP, CBC    Echo 1/1/21 unremarkable with normal EF of 65%, dilated ascending aorta 4.3 CM    Diabetes on lantus and sliding scale    Likely discharging on Wednesday after the remdesivir finished. ambulation test before discharge.  Needs transportation to go home as well      Consultants/Specialty      Code Status  Full Code    Disposition  Home , ambulation test before discharge    Review of Systems  Review of Systems   Constitutional: Positive for  malaise/fatigue. Negative for chills, diaphoresis and fever.   HENT: Negative for congestion, ear discharge, ear pain and sore throat.    Eyes: Negative for pain, discharge and redness.   Respiratory: Positive for cough and shortness of breath. Negative for hemoptysis and wheezing.    Cardiovascular: Negative for chest pain, palpitations and leg swelling.   Gastrointestinal: Negative for abdominal pain, constipation, diarrhea, nausea and vomiting.   Genitourinary: Negative for dysuria, flank pain, frequency, hematuria and urgency.   Musculoskeletal: Negative for back pain, falls, joint pain and neck pain.   Skin: Negative for itching.   Neurological: Positive for weakness. Negative for dizziness, tremors, sensory change, speech change, focal weakness, loss of consciousness and headaches.   Psychiatric/Behavioral: Negative for depression, hallucinations and substance abuse. The patient is not nervous/anxious.         Physical Exam  Temp:  [36.3 °C (97.3 °F)-37.1 °C (98.7 °F)] 36.3 °C (97.3 °F)  Pulse:  [60-91] 78  Resp:  [17-18] 18  BP: (132-158)/(83-97) 145/83  SpO2:  [90 %-93 %] 90 %    Physical Exam  Constitutional:       General: He is not in acute distress.     Appearance: Normal appearance. He is obese. He is not ill-appearing or diaphoretic.   HENT:      Head: Normocephalic and atraumatic.      Nose: Nose normal.      Mouth/Throat:      Pharynx: Oropharynx is clear. No oropharyngeal exudate or posterior oropharyngeal erythema.   Eyes:      General:         Right eye: No discharge.         Left eye: No discharge.      Extraocular Movements: Extraocular movements intact.      Conjunctiva/sclera: Conjunctivae normal.      Pupils: Pupils are equal, round, and reactive to light.   Neck:      Musculoskeletal: Normal range of motion and neck supple. No neck rigidity or muscular tenderness.   Cardiovascular:      Rate and Rhythm: Normal rate and regular rhythm.      Pulses: Normal pulses.      Heart sounds: Normal  heart sounds. No gallop.    Pulmonary:      Effort: Respiratory distress present.      Breath sounds: No stridor. No wheezing or rhonchi.      Comments: Decreased breathing sounds  Chest:      Chest wall: No tenderness.   Abdominal:      General: Bowel sounds are normal. There is no distension.      Palpations: Abdomen is soft.      Tenderness: There is no abdominal tenderness. There is no guarding or rebound.   Musculoskeletal: Normal range of motion.         General: No swelling, tenderness, deformity or signs of injury.      Right lower leg: No edema.      Left lower leg: No edema.   Neurological:      General: No focal deficit present.      Mental Status: He is alert and oriented to person, place, and time. Mental status is at baseline.      Sensory: No sensory deficit.      Motor: No weakness.      Gait: Gait normal.   Psychiatric:         Mood and Affect: Mood normal.         Behavior: Behavior normal.         Thought Content: Thought content normal.         Fluids    Intake/Output Summary (Last 24 hours) at 1/4/2021 1411  Last data filed at 1/3/2021 2026  Gross per 24 hour   Intake --   Output 310 ml   Net -310 ml       Laboratory  Recent Labs     01/02/21  1127 01/03/21  1207 01/04/21  1008   WBC 8.3 6.3 5.6   RBC 5.66 6.02 5.83   HEMOGLOBIN 16.4 17.0 16.6   HEMATOCRIT 54.2* 56.4* 54.2*   MCV 95.8 93.7 93.0   MCH 29.0 28.2 28.5   MCHC 30.3* 30.1* 30.6*   RDW 54.4* 51.8* 50.5*   PLATELETCT 215 246 252   MPV 10.2 10.7 10.5     Recent Labs     01/02/21  1127 01/03/21  1207 01/04/21  1008   SODIUM 141 141 135   POTASSIUM 4.3 3.9 3.7   CHLORIDE 100 99 96   CO2 35* 33 34*   GLUCOSE 136* 181* 220*   BUN 20 21 20   CREATININE 0.75 0.61 0.66   CALCIUM 8.7 8.9 8.7                   Imaging  EC-ECHOCARDIOGRAM LTD W/O CONT   Final Result      DX-CHEST-PORTABLE (1 VIEW)   Final Result         Diffuse interstitial prominence and hazy peripheral opacities bilaterally, concerning for Covid 19 pneumonia            Assessment/Plan  * Acute hypoxemic respiratory failure due to COVID-19 (HCC)  Assessment & Plan  Procalcitonin 0.06, proBNP 114, D-dimer 0.67, WBC 7.4  COVID-19 positive 12/30/20 at [place] ED  Echo 1/1/2021 unremarkable with normal EF of 65%, dilated ascending aorta 4.3 CM    Decadron 6mg x10 doses (12/30-1/8)  Self-proning encouraged  remdesivir 1/1- 1/5,   Lasix   monitor CMP, CBC        Acute respiratory failure with hypoxia (HCC)  Assessment & Plan  Secondary Covid  Minimally levated D-dimer defer LE doppler / CTA PE workup at this time  Diuresis as tolerated - Lasix 40mg PO daily x3 doses    Diabetes (HCC)  Assessment & Plan  Hold home metformin,    HbA1c 7.3  Lantus + ISS    Hypertension  Assessment & Plan  HOLD home amlodipine and losartan  Diurese with Lasix    Leukocytosis  Assessment & Plan  Procalcitonin 0.0 6 (12/31)  -Likely secondary to steroid use    Ascending aorta dilatation (HCC)  Assessment & Plan  Echo 1/1/21 unremarkable with normal EF of 65%, dilated ascending aorta 4.3 CM    -Asymptomatic, outpatient follow-up      GERD (gastroesophageal reflux disease)  Assessment & Plan  Omeprazole    MICHELLE (obstructive sleep apnea)  Assessment & Plan  No longer requires CPAP since uvulectomy and tonsillectomy    Class 2 obesity in adult  Assessment & Plan  Diet and lifestyle modification discussed    Hyperlipidemia  Assessment & Plan  Continue statin       VTE prophylaxis: Lovenox

## 2021-01-05 LAB
ALBUMIN SERPL BCP-MCNC: 3.2 G/DL (ref 3.2–4.9)
ALBUMIN/GLOB SERPL: 0.8 G/DL
ALP SERPL-CCNC: 85 U/L (ref 30–99)
ALT SERPL-CCNC: 54 U/L (ref 2–50)
ANION GAP SERPL CALC-SCNC: 12 MMOL/L (ref 7–16)
AST SERPL-CCNC: 35 U/L (ref 12–45)
BACTERIA BLD CULT: NORMAL
BACTERIA BLD CULT: NORMAL
BASOPHILS # BLD AUTO: 0.1 % (ref 0–1.8)
BASOPHILS # BLD: 0.01 K/UL (ref 0–0.12)
BILIRUB SERPL-MCNC: 0.7 MG/DL (ref 0.1–1.5)
BUN SERPL-MCNC: 20 MG/DL (ref 8–22)
CALCIUM SERPL-MCNC: 8.9 MG/DL (ref 8.5–10.5)
CHLORIDE SERPL-SCNC: 100 MMOL/L (ref 96–112)
CO2 SERPL-SCNC: 30 MMOL/L (ref 20–33)
CREAT SERPL-MCNC: 0.66 MG/DL (ref 0.5–1.4)
EOSINOPHIL # BLD AUTO: 0.01 K/UL (ref 0–0.51)
EOSINOPHIL NFR BLD: 0.1 % (ref 0–6.9)
ERYTHROCYTE [DISTWIDTH] IN BLOOD BY AUTOMATED COUNT: 49.9 FL (ref 35.9–50)
GLOBULIN SER CALC-MCNC: 4.1 G/DL (ref 1.9–3.5)
GLUCOSE BLD-MCNC: 138 MG/DL (ref 65–99)
GLUCOSE BLD-MCNC: 139 MG/DL (ref 65–99)
GLUCOSE BLD-MCNC: 154 MG/DL (ref 65–99)
GLUCOSE BLD-MCNC: 97 MG/DL (ref 65–99)
GLUCOSE SERPL-MCNC: 150 MG/DL (ref 65–99)
HCT VFR BLD AUTO: 56.6 % (ref 42–52)
HGB BLD-MCNC: 17.8 G/DL (ref 14–18)
IMM GRANULOCYTES # BLD AUTO: 0.04 K/UL (ref 0–0.11)
IMM GRANULOCYTES NFR BLD AUTO: 0.5 % (ref 0–0.9)
LYMPHOCYTES # BLD AUTO: 0.86 K/UL (ref 1–4.8)
LYMPHOCYTES NFR BLD: 10.6 % (ref 22–41)
MCH RBC QN AUTO: 29.4 PG (ref 27–33)
MCHC RBC AUTO-ENTMCNC: 31.4 G/DL (ref 33.7–35.3)
MCV RBC AUTO: 93.4 FL (ref 81.4–97.8)
MONOCYTES # BLD AUTO: 0.52 K/UL (ref 0–0.85)
MONOCYTES NFR BLD AUTO: 6.4 % (ref 0–13.4)
NEUTROPHILS # BLD AUTO: 6.69 K/UL (ref 1.82–7.42)
NEUTROPHILS NFR BLD: 82.3 % (ref 44–72)
NRBC # BLD AUTO: 0 K/UL
NRBC BLD-RTO: 0 /100 WBC
PLATELET # BLD AUTO: 321 K/UL (ref 164–446)
PMV BLD AUTO: 10.3 FL (ref 9–12.9)
POTASSIUM SERPL-SCNC: 4.4 MMOL/L (ref 3.6–5.5)
PROT SERPL-MCNC: 7.3 G/DL (ref 6–8.2)
RBC # BLD AUTO: 6.06 M/UL (ref 4.7–6.1)
SIGNIFICANT IND 70042: NORMAL
SIGNIFICANT IND 70042: NORMAL
SITE SITE: NORMAL
SITE SITE: NORMAL
SODIUM SERPL-SCNC: 142 MMOL/L (ref 135–145)
SOURCE SOURCE: NORMAL
SOURCE SOURCE: NORMAL
WBC # BLD AUTO: 8.1 K/UL (ref 4.8–10.8)

## 2021-01-05 PROCEDURE — A9270 NON-COVERED ITEM OR SERVICE: HCPCS | Performed by: STUDENT IN AN ORGANIZED HEALTH CARE EDUCATION/TRAINING PROGRAM

## 2021-01-05 PROCEDURE — 770014 HCHG ROOM/CARE - WARD (150)

## 2021-01-05 PROCEDURE — 82962 GLUCOSE BLOOD TEST: CPT

## 2021-01-05 PROCEDURE — 80053 COMPREHEN METABOLIC PANEL: CPT

## 2021-01-05 PROCEDURE — 94760 N-INVAS EAR/PLS OXIMETRY 1: CPT

## 2021-01-05 PROCEDURE — 85025 COMPLETE CBC W/AUTO DIFF WBC: CPT

## 2021-01-05 PROCEDURE — 36415 COLL VENOUS BLD VENIPUNCTURE: CPT

## 2021-01-05 PROCEDURE — 700101 HCHG RX REV CODE 250: Performed by: INTERNAL MEDICINE

## 2021-01-05 PROCEDURE — 700111 HCHG RX REV CODE 636 W/ 250 OVERRIDE (IP): Performed by: STUDENT IN AN ORGANIZED HEALTH CARE EDUCATION/TRAINING PROGRAM

## 2021-01-05 PROCEDURE — 700102 HCHG RX REV CODE 250 W/ 637 OVERRIDE(OP): Performed by: STUDENT IN AN ORGANIZED HEALTH CARE EDUCATION/TRAINING PROGRAM

## 2021-01-05 PROCEDURE — 700105 HCHG RX REV CODE 258: Performed by: INTERNAL MEDICINE

## 2021-01-05 RX ORDER — DEXAMETHASONE 6 MG/1
6 TABLET ORAL DAILY
Qty: 2 TAB | Refills: 0 | Status: SHIPPED | OUTPATIENT
Start: 2021-01-06 | End: 2021-01-08

## 2021-01-05 RX ORDER — FAMOTIDINE 10 MG
10 TABLET ORAL DAILY
Qty: 60 TAB | Refills: 0 | Status: SHIPPED | OUTPATIENT
Start: 2021-01-06

## 2021-01-05 RX ORDER — GUAIFENESIN 600 MG/1
600 TABLET, EXTENDED RELEASE ORAL EVERY 12 HOURS PRN
Qty: 28 TAB | Refills: 0 | Status: SHIPPED | OUTPATIENT
Start: 2021-01-05

## 2021-01-05 RX ORDER — ASPIRIN 81 MG/1
81 TABLET, CHEWABLE ORAL DAILY
Qty: 100 TAB | Refills: 0 | Status: SHIPPED | OUTPATIENT
Start: 2021-01-06

## 2021-01-05 RX ORDER — ACETAMINOPHEN 325 MG/1
650 TABLET ORAL EVERY 6 HOURS PRN
Qty: 30 TAB | Refills: 0 | Status: SHIPPED | OUTPATIENT
Start: 2021-01-05

## 2021-01-05 RX ADMIN — REMDESIVIR 100 MG: 100 INJECTION, POWDER, LYOPHILIZED, FOR SOLUTION INTRAVENOUS at 17:51

## 2021-01-05 RX ADMIN — FUROSEMIDE 40 MG: 20 TABLET ORAL at 06:10

## 2021-01-05 RX ADMIN — FAMOTIDINE 10 MG: 10 TABLET, FILM COATED ORAL at 06:08

## 2021-01-05 RX ADMIN — DEXAMETHASONE 6 MG: 4 TABLET ORAL at 06:09

## 2021-01-05 RX ADMIN — INSULIN GLARGINE 20 UNITS: 100 INJECTION, SOLUTION SUBCUTANEOUS at 06:18

## 2021-01-05 RX ADMIN — ASPIRIN 81 MG CHEWABLE TABLET 81 MG: 81 TABLET CHEWABLE at 06:10

## 2021-01-05 RX ADMIN — ENOXAPARIN SODIUM 40 MG: 40 INJECTION SUBCUTANEOUS at 06:00

## 2021-01-05 ASSESSMENT — ENCOUNTER SYMPTOMS
DIZZINESS: 0
LOSS OF CONSCIOUSNESS: 0
HEMOPTYSIS: 0
COUGH: 1
ABDOMINAL PAIN: 0
NECK PAIN: 0
WEAKNESS: 1
DEPRESSION: 0
DIARRHEA: 0
FEVER: 0
HEADACHES: 0
EYE PAIN: 0
CHILLS: 0
CONSTIPATION: 0
PALPITATIONS: 0
DIAPHORESIS: 0
FALLS: 0
HALLUCINATIONS: 0
NAUSEA: 0
SPEECH CHANGE: 0
BACK PAIN: 0
SORE THROAT: 0
EYE DISCHARGE: 0
NERVOUS/ANXIOUS: 0
TREMORS: 0
EYE REDNESS: 0
FLANK PAIN: 0
VOMITING: 0
SHORTNESS OF BREATH: 1
FOCAL WEAKNESS: 0
WHEEZING: 0
SENSORY CHANGE: 0

## 2021-01-05 ASSESSMENT — LIFESTYLE VARIABLES: SUBSTANCE_ABUSE: 0

## 2021-01-05 NOTE — DISCHARGE PLANNING
Received Message from Ozzie Hester has already delivered Concentrator and OK to go. Called and spoke with Eloy Bolden and he will take tank to patient for D/C. Melody HURST updated.

## 2021-01-05 NOTE — PROGRESS NOTES
Hospital Medicine Daily Progress Note    Date of Service  1/5/2021    Chief Complaint  65 y.o. male admitted 12/30/2020 with shortness of breath    Hospital Course  65 y.o. male with history of hypertension, diabetes, hyperlipidemia who presented 12/30/2020 to [place] ED with complains of hypoxia and shortness of breath then transferred to [facility] as direct admit after patient was found to have COVID-19.  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  He stated that his wife is also sick with COVID-19.  Reported generalized myalgia, subjective fever, headaches and progressive shortness of breath.  Patient noted his oxygen level as low as 50% in [place] ED, improved with 5 to 6 L oxygen support.  Patient was transferred to [facility] for higher level care.  At time of my evaluation, his saturation greater than 92% on 5 L, alert awake with no apparent respiratory distress.  He was given Decadron 6 mg prior to transfer.  Will transfer patient to [facility] will close monitoring and possible discharge in a.m. with home oxygen.      Interval Problem Update  I evaluated and examined the patient at the bedside.  Labs and imaging were reviewed. Care plan was discussed with the patient and bedside nurse.  Patient no acute distress this morning  Saturating well on 3 L nasal cannula but required more than 4 L for ambulation.  Echo 1/1/21 unremarkable with normal EF of 65%, dilated ascending aorta 4.3 CM  Continue steroids regimen (last dose 1/8)  Continue remdesivir  Fluid restriction and diuresis with Lasix  Frequent pronation incentive spirometer  Up to chair for meals  Labs in a.m.    Consultants/Specialty  Infectious disease    Code Status  Full Code    Disposition  Home , ambulation test before discharge    Review of Systems  Review of Systems   Constitutional: Positive for malaise/fatigue. Negative for chills,  diaphoresis and fever.   HENT: Negative for congestion, ear discharge, ear pain and sore throat.    Eyes: Negative for pain, discharge and redness.   Respiratory: Positive for cough and shortness of breath. Negative for hemoptysis and wheezing.    Cardiovascular: Negative for chest pain, palpitations and leg swelling.   Gastrointestinal: Negative for abdominal pain, constipation, diarrhea, nausea and vomiting.   Genitourinary: Negative for dysuria, flank pain, frequency, hematuria and urgency.   Musculoskeletal: Negative for back pain, falls, joint pain and neck pain.   Skin: Negative for itching.   Neurological: Positive for weakness. Negative for dizziness, tremors, sensory change, speech change, focal weakness, loss of consciousness and headaches.   Psychiatric/Behavioral: Negative for depression, hallucinations and substance abuse. The patient is not nervous/anxious.         Physical Exam  Temp:  [35.9 °C (96.6 °F)-37 °C (98.6 °F)] 36.4 °C (97.6 °F)  Pulse:  [60-81] 60  Resp:  [18-20] 18  BP: (138-156)/(83-86) 138/86  SpO2:  [90 %-94 %] 93 %    Physical Exam  Constitutional:       General: He is not in acute distress.     Appearance: Normal appearance. He is obese. He is not ill-appearing or diaphoretic.   HENT:      Head: Normocephalic and atraumatic.      Nose: Nose normal.      Mouth/Throat:      Pharynx: Oropharynx is clear. No oropharyngeal exudate or posterior oropharyngeal erythema.   Eyes:      General:         Right eye: No discharge.         Left eye: No discharge.      Extraocular Movements: Extraocular movements intact.      Conjunctiva/sclera: Conjunctivae normal.      Pupils: Pupils are equal, round, and reactive to light.   Neck:      Musculoskeletal: Normal range of motion and neck supple. No neck rigidity or muscular tenderness.   Cardiovascular:      Rate and Rhythm: Normal rate and regular rhythm.      Pulses: Normal pulses.      Heart sounds: Normal heart sounds. No gallop.    Pulmonary:       Effort: Respiratory distress present.      Breath sounds: No stridor. No wheezing or rhonchi.      Comments: Decreased breathing sounds  Chest:      Chest wall: No tenderness.   Abdominal:      General: Bowel sounds are normal. There is no distension.      Palpations: Abdomen is soft.      Tenderness: There is no abdominal tenderness. There is no guarding or rebound.   Musculoskeletal: Normal range of motion.         General: No swelling, tenderness, deformity or signs of injury.      Right lower leg: No edema.      Left lower leg: No edema.   Neurological:      General: No focal deficit present.      Mental Status: He is alert and oriented to person, place, and time. Mental status is at baseline.      Sensory: No sensory deficit.      Motor: No weakness.      Gait: Gait normal.   Psychiatric:         Mood and Affect: Mood normal.         Behavior: Behavior normal.         Thought Content: Thought content normal.         Fluids    Intake/Output Summary (Last 24 hours) at 1/5/2021 0752  Last data filed at 1/4/2021 2112  Gross per 24 hour   Intake --   Output 310 ml   Net -310 ml       Laboratory  Recent Labs     01/02/21  1127 01/03/21  1207 01/04/21  1008   WBC 8.3 6.3 5.6   RBC 5.66 6.02 5.83   HEMOGLOBIN 16.4 17.0 16.6   HEMATOCRIT 54.2* 56.4* 54.2*   MCV 95.8 93.7 93.0   MCH 29.0 28.2 28.5   MCHC 30.3* 30.1* 30.6*   RDW 54.4* 51.8* 50.5*   PLATELETCT 215 246 252   MPV 10.2 10.7 10.5     Recent Labs     01/02/21  1127 01/03/21  1207 01/04/21  1008   SODIUM 141 141 135   POTASSIUM 4.3 3.9 3.7   CHLORIDE 100 99 96   CO2 35* 33 34*   GLUCOSE 136* 181* 220*   BUN 20 21 20   CREATININE 0.75 0.61 0.66   CALCIUM 8.7 8.9 8.7                   Imaging  EC-ECHOCARDIOGRAM LTD W/O CONT   Final Result      DX-CHEST-PORTABLE (1 VIEW)   Final Result         Diffuse interstitial prominence and hazy peripheral opacities bilaterally, concerning for Covid 19 pneumonia           Assessment/Plan  * Acute hypoxemic respiratory failure  due to COVID-19 (HCC)  Assessment & Plan  Procalcitonin 0.06, proBNP 114, D-dimer 0.67, WBC 7.4  COVID-19 positive 12/30/20 at [place] ED  Echo 1/1/2021 unremarkable with normal EF of 65%, dilated ascending aorta 4.3 CM  Continue steroids regimen (last dose 1/8)  Continue remdesivir  Fluid restriction and diuresis with Lasix  Frequent pronation incentive spirometer  Up to chair for meals  Labs in a.m.    Acute respiratory failure with hypoxia (HCC)  Assessment & Plan  Secondary Covid  Minimally levated D-dimer defer LE doppler / CTA PE workup at this time  Diuresis as tolerated - Lasix 40mg PO daily x3 doses    Diabetes (HCC)  Assessment & Plan  Hold home metformin,    HbA1c 7.3  Lantus + ISS    Hypertension  Assessment & Plan  HOLD home amlodipine and losartan  Diurese with Lasix    Leukocytosis  Assessment & Plan  Secondary to steroid use    Ascending aorta dilatation (HCC)  Assessment & Plan  Echo 1/1/21 unremarkable with normal EF of 65%, dilated ascending aorta 4.3 CM    -Asymptomatic, outpatient follow-up      GERD (gastroesophageal reflux disease)  Assessment & Plan  Omeprazole    MICHELLE (obstructive sleep apnea)  Assessment & Plan  No longer requires CPAP since uvulectomy and tonsillectomy    Class 2 obesity in adult  Assessment & Plan  Diet and lifestyle modification discussed    Hyperlipidemia  Assessment & Plan  Continue statin       VTE prophylaxis: Lovenox

## 2021-01-05 NOTE — PROGRESS NOTES
Assessment complete. Pt A&O X4. Mild work of breathing, SPO2 90% 3LL n/c. POC discussed. belongings within reach. Needs met at this time.

## 2021-01-05 NOTE — FACE TO FACE
"Face to Face Note  -  Durable Medical Equipment    Teddy Jackson M.D. - NPI: [id number]  I certify that this patient is under my care and that they had a durable medical equipment(DME)face to face encounter by myself that meets the physician DME face-to-face encounter requirements with this patient on:    Date of encounter:   Patient:                    MRN:                       YOB: 2023  Momo Gonzalez  [id number]  1955     The encounter with the patient was in whole, or in part, for the following medical condition, which is the primary reason for durable medical equipment:  Other - COVID pnumonia    I certify that, based on my findings, the following durable medical equipment is medically necessary:  Oxygen.    HOME O2 Saturation Measurements:(Values must be present for Home Oxygen orders)  Room air sat at rest: 86  Room air sat with amb: 83  With liters of O2: 3, O2 sat at rest with O2: 90  With Liters of O2: 4, O2 sat with amb with O2 :   Is the patient mobile?: Yes    My Clinical findings support the need for the above equipment due to:  Hypoxia    Supporting Symptoms: The patient requires supplemental oxygen, as the following interventions have been tried with limited or no improvement: \"Oral and/or IV steroids    If patient feels more short of breath, they can go up to 6 liters per minute and contact healthcare provider.  "

## 2021-01-05 NOTE — DISCHARGE SUMMARY
Discharge Summary    CHIEF COMPLAINT ON ADMISSION  Dyspnea    Reason for Admission  Hypoxemia     Admission Date  12/30/2020    CODE STATUS  Full Code    HPI & HOSPITAL COURSE  65-year-old male with a past medical history of hypertension, diabetes, and hyperlipidemia [facility] for acute hypoxic respiratory failure secondary to Covid pneumonia noted on nasopharyngeal swab and chest x-ray.  He was started on steroid regimen.  His oxygen requirements worsen and per Axis disease patient was started on remdesivir regimen.  Additionally patient was started on a diuresis regimen with Lasix.  His O2 requirements improved shortly after and as per home oxygen evaluation patient is to be discharged home with home oxygen and is to complete his steroid regimen as an outpatient.  Patient was instructed to follow-up with primary care provider within 2 weeks and to quarantine at home for the next 2 weeks.  All results and plan of action were discussed with the patient for which she voiced understanding and agreed with the primary care team.  Patient was instructed to return to emergency department symptoms were to worsen.    Therefore, he is discharged in good and stable condition to home with close outpatient follow-up.    The patient met 2-midnight criteria for an inpatient stay at the time of discharge.    Discharge Date  1/6/2021    FOLLOW UP ITEMS POST DISCHARGE  Primary care provider to follow respiratory function after steroid completion    DISCHARGE DIAGNOSES  Principal Problem:    Acute hypoxemic respiratory failure due to COVID-19 (HCC) POA: Unknown  Active Problems:    Acute respiratory failure with hypoxia (HCC) POA: Unknown    Hypertension POA: Unknown    Diabetes (HCC) POA: Unknown    Hyperlipidemia POA: Unknown    Class 2 obesity in adult POA: Unknown    MICHELLE (obstructive sleep apnea) POA: Unknown    GERD (gastroesophageal reflux disease) POA: Unknown    Ascending aorta dilatation  (HCC) POA: Unknown    Leukocytosis POA: Unknown  Resolved Problems:    * No resolved hospital problems. *      FOLLOW UP  No future appointments.  [facility]  [address] [place] [id number]  [phone]  Call  call [employer] when you arrive home to deliver O2 concentrator      MEDICATIONS ON DISCHARGE     Medication List      You have not been prescribed any medications.         Allergies  No Known Allergies    DIET  Orders Placed This Encounter   Procedures   • Diet Order Diet: Consistent CHO (Diabetic); Second Modifier: (optional): Cardiac; Fluid modifications: (optional): 1500 ml Fluid Restriction     Standing Status:   Standing     Number of Occurrences:   1     Order Specific Question:   Diet:     Answer:   Consistent CHO (Diabetic) [4]     Order Specific Question:   Second Modifier: (optional)     Answer:   Cardiac [6]     Order Specific Question:   Fluid modifications: (optional)     Answer:   1500 ml Fluid Restriction [9]       ACTIVITY  As tolerated.  Weight bearing as tolerated    CONSULTATIONS  Infectious disease    PROCEDURES  None    LABORATORY  Lab Results   Component Value Date    SODIUM 135 01/04/2021    POTASSIUM 3.7 01/04/2021    CHLORIDE 96 01/04/2021    CO2 34 (H) 01/04/2021    GLUCOSE 220 (H) 01/04/2021    BUN 20 01/04/2021    CREATININE 0.66 01/04/2021        Lab Results   Component Value Date    WBC 5.6 01/04/2021    HEMOGLOBIN 16.6 01/04/2021    HEMATOCRIT 54.2 (H) 01/04/2021    PLATELETCT 252 01/04/2021        Total time of the discharge process exceeds 33 minutes.

## 2021-01-05 NOTE — RESPIRATORY CARE
REMOTE MONITORING PROGRAM by RESPIRATORY THERAPY  1/5/2021 at 10:39 AM by Patric Boland     Patient does not have smart phone and is therefore not eligible to participate in remote monitoring program at this time.

## 2021-01-06 ENCOUNTER — PHARMACY VISIT (OUTPATIENT)
Dept: PHARMACY | Facility: MEDICAL CENTER | Age: 66
End: 2021-01-06
Payer: COMMERCIAL

## 2021-01-06 VITALS
HEIGHT: 71 IN | RESPIRATION RATE: 18 BRPM | SYSTOLIC BLOOD PRESSURE: 138 MMHG | HEART RATE: 70 BPM | DIASTOLIC BLOOD PRESSURE: 79 MMHG | OXYGEN SATURATION: 94 % | WEIGHT: 285.94 LBS | BODY MASS INDEX: 40.03 KG/M2 | TEMPERATURE: 98 F

## 2021-01-06 LAB
ALBUMIN SERPL BCP-MCNC: 3.5 G/DL (ref 3.2–4.9)
ALBUMIN/GLOB SERPL: 0.9 G/DL
ALP SERPL-CCNC: 83 U/L (ref 30–99)
ALT SERPL-CCNC: 52 U/L (ref 2–50)
ANION GAP SERPL CALC-SCNC: 8 MMOL/L (ref 7–16)
AST SERPL-CCNC: 34 U/L (ref 12–45)
BILIRUB SERPL-MCNC: 0.7 MG/DL (ref 0.1–1.5)
BUN SERPL-MCNC: 19 MG/DL (ref 8–22)
CALCIUM SERPL-MCNC: 8.6 MG/DL (ref 8.5–10.5)
CHLORIDE SERPL-SCNC: 99 MMOL/L (ref 96–112)
CO2 SERPL-SCNC: 31 MMOL/L (ref 20–33)
CREAT SERPL-MCNC: 0.74 MG/DL (ref 0.5–1.4)
GLOBULIN SER CALC-MCNC: 3.7 G/DL (ref 1.9–3.5)
GLUCOSE BLD-MCNC: 175 MG/DL (ref 65–99)
GLUCOSE BLD-MCNC: 94 MG/DL (ref 65–99)
GLUCOSE SERPL-MCNC: 175 MG/DL (ref 65–99)
POTASSIUM SERPL-SCNC: 4.2 MMOL/L (ref 3.6–5.5)
PROT SERPL-MCNC: 7.2 G/DL (ref 6–8.2)
SODIUM SERPL-SCNC: 138 MMOL/L (ref 135–145)

## 2021-01-06 PROCEDURE — 80053 COMPREHEN METABOLIC PANEL: CPT

## 2021-01-06 PROCEDURE — 700102 HCHG RX REV CODE 250 W/ 637 OVERRIDE(OP): Performed by: STUDENT IN AN ORGANIZED HEALTH CARE EDUCATION/TRAINING PROGRAM

## 2021-01-06 PROCEDURE — A9270 NON-COVERED ITEM OR SERVICE: HCPCS | Performed by: STUDENT IN AN ORGANIZED HEALTH CARE EDUCATION/TRAINING PROGRAM

## 2021-01-06 PROCEDURE — RXMED WILLOW AMBULATORY MEDICATION CHARGE: Performed by: STUDENT IN AN ORGANIZED HEALTH CARE EDUCATION/TRAINING PROGRAM

## 2021-01-06 PROCEDURE — 36415 COLL VENOUS BLD VENIPUNCTURE: CPT

## 2021-01-06 PROCEDURE — 99239 HOSP IP/OBS DSCHRG MGMT >30: CPT | Performed by: STUDENT IN AN ORGANIZED HEALTH CARE EDUCATION/TRAINING PROGRAM

## 2021-01-06 PROCEDURE — 700111 HCHG RX REV CODE 636 W/ 250 OVERRIDE (IP): Performed by: STUDENT IN AN ORGANIZED HEALTH CARE EDUCATION/TRAINING PROGRAM

## 2021-01-06 PROCEDURE — 82962 GLUCOSE BLOOD TEST: CPT

## 2021-01-06 RX ADMIN — DEXAMETHASONE 6 MG: 4 TABLET ORAL at 05:34

## 2021-01-06 RX ADMIN — FUROSEMIDE 40 MG: 20 TABLET ORAL at 05:35

## 2021-01-06 RX ADMIN — ASPIRIN 81 MG CHEWABLE TABLET 81 MG: 81 TABLET CHEWABLE at 05:34

## 2021-01-06 RX ADMIN — ENOXAPARIN SODIUM 40 MG: 40 INJECTION SUBCUTANEOUS at 05:35

## 2021-01-06 RX ADMIN — FAMOTIDINE 10 MG: 10 TABLET, FILM COATED ORAL at 05:34

## 2021-01-06 NOTE — DISCHARGE PLANNING
Agency/Facility Name: [facility]   Spoke To: Katalina  Outcome: Katalina inquiring about time of discharge. Gave permission to take 2 tanks from bunker if needed.     @[id number]  [facility], rt aide will deliver 2 tanks to bedside shortly. [facility] notified [facility] of transportation time.

## 2021-01-06 NOTE — CARE PLAN
Problem: Communication  Goal: The ability to communicate needs accurately and effectively will improve  Outcome: PROGRESSING AS EXPECTED     Problem: Safety  Goal: Will remain free from injury  Outcome: PROGRESSING AS EXPECTED  Goal: Will remain free from falls  Outcome: PROGRESSING AS EXPECTED     Problem: Infection  Goal: Will remain free from infection  Outcome: PROGRESSING AS EXPECTED     Problem: Venous Thromboembolism (VTW)/Deep Vein Thrombosis (DVT) Prevention:  Goal: Patient will participate in Venous Thrombosis (VTE)/Deep Vein Thrombosis (DVT)Prevention Measures  Outcome: PROGRESSING AS EXPECTED     Problem: Bowel/Gastric:  Goal: Normal bowel function is maintained or improved  Outcome: PROGRESSING AS EXPECTED  Goal: Will not experience complications related to bowel motility  Outcome: PROGRESSING AS EXPECTED     Problem: Knowledge Deficit  Goal: Knowledge of disease process/condition, treatment plan, diagnostic tests, and medications will improve  Outcome: PROGRESSING AS EXPECTED  Goal: Knowledge of the prescribed therapeutic regimen will improve  Outcome: PROGRESSING AS EXPECTED     Problem: Discharge Barriers/Planning  Goal: Patient's continuum of care needs will be met  Outcome: PROGRESSING AS EXPECTED     Problem: Respiratory:  Goal: Respiratory status will improve  Outcome: PROGRESSING AS EXPECTED     Problem: Pain Management  Goal: Pain level will decrease to patient's comfort goal  Outcome: PROGRESSING AS EXPECTED     Problem: Skin Integrity  Goal: Risk for impaired skin integrity will decrease  Outcome: PROGRESSING AS EXPECTED

## 2021-01-06 NOTE — PROGRESS NOTES
Handoff report received. Assumed patient care. PT is reclined in bed, [id number].  POC discussed with patient and all questions addressed. Safety precaution in place.

## 2021-01-06 NOTE — FACE TO FACE
"Face to Face Note  -  Durable Medical Equipment    Teddy Jackson M.D. - NPI: [id number]  I certify that this patient is under my care and that they had a durable medical equipment(DME)face to face encounter by myself that meets the physician DME face-to-face encounter requirements with this patient on:    Date of encounter:   Patient:                    MRN:                       YOB: 2023  Momo Gonzalez  [id number]  1955     The encounter with the patient was in whole, or in part, for the following medical condition, which is the primary reason for durable medical equipment:  Other - COVID    I certify that, based on my findings, the following durable medical equipment is medically necessary:  Oxygen.    HOME O2 Saturation Measurements:(Values must be present for Home Oxygen orders)  Room air sat at rest: 92  Room air sat with amb: 84  With liters of O2: 3, O2 sat at rest with O2: 93  With Liters of O2: 3, O2 sat with amb with O2 : 87  Is the patient mobile?: Yes    My Clinical findings support the need for the above equipment due to:  Hypoxia    Supporting Symptoms: The patient requires supplemental oxygen, as the following interventions have been tried with limited or no improvement: \"Oral and/or IV steroids    If patient feels more short of breath, they can go up to 6 liters per minute and contact healthcare provider.  "

## 2021-01-06 NOTE — DISCHARGE PLANNING
Called and spoke with Spouse and she has arranged [employer] [facility]. She will call and have ride here between 3:30 and 4 pm. Called and updated Allyson with [facility]. Yesenia DAMON will call to get tank from [facility]. Message sent to Vaishnavi HURST to update.

## 2021-01-07 NOTE — PROGRESS NOTES
Pt medically cleared for discharge home, pt receied discharge medications, instructions, he verbalized understanding. All questions were answered. Pt leaves the unit stable on wheelchair with all belongings including O2.

## 2021-01-07 NOTE — DISCHARGE INSTRUCTIONS
Discharge Instructions    Discharged to home by car with relative. Discharged via wheelchair, hospital escort: Yes.  Special equipment needed: Oxygen    Be sure to schedule a follow-up appointment with your primary care doctor or any specialists as instructed.     Discharge Plan:   Diet Plan: Discussed  Activity Level: Discussed  Confirmed Follow up Appointment: Patient to Call and Schedule Appointment  Confirmed Symptoms Management: Discussed  Influenza Vaccine Indication: Indicated: 65 years and older    I understand that a diet low in cholesterol, fat, and sodium is recommended for good health. Unless I have been given specific instructions below for another diet, I accept this instruction as my diet prescription.   Other diet: diabetic    Special Instructions: None    · Is patient discharged on Warfarin / Coumadin?   No     Depression / Suicide Risk    As you are discharged from this [facility] facility, it is important to learn how to keep safe from harming yourself.    Recognize the warning signs:  · Abrupt changes in personality, positive or negative- including increase in energy   · Giving away possessions  · Change in eating patterns- significant weight changes-  positive or negative  · Change in sleeping patterns- unable to sleep or sleeping all the time   · Unwillingness or inability to communicate  · Depression  · Unusual sadness, discouragement and loneliness  · Talk of wanting to die  · Neglect of personal appearance   · Rebelliousness- reckless behavior  · Withdrawal from people/activities they love  · Confusion- inability to concentrate     If you or a loved one observes any of these behaviors or has concerns about self-harm, here's what you can do:  · Talk about it- your feelings and reasons for harming yourself  · Remove any means that you might use to hurt yourself (examples: pills, rope, extension cords, firearm)  · Get professional help from the community (Mental Health, Substance Abuse,  psychological counseling)  · Do not be alone:Call your Safe Contact- someone whom you trust who will be there for you.  · Call your local CRISIS HOTLINE 857-8792 or 121-781-7831  · Call your local Children's Mobile Crisis Response Team [place] (900) 167-3155 or www.[place] [facility]ARK  · Call the toll free National Suicide Prevention Hotlines   · National Suicide Prevention Lifeline 814-055-RSBA (1371)  · National Hope Line Network 800-SUICIDE (639-2281)    INSTRUCTIONS FOR COVID-19 OR ANY OTHER INFECTIOUS RESPIRATORY ILLNESSES    The Centers for Disease Control and Prevention (CDC) states that early indications for COVID-19 include cough, shortness of breath, difficulty breathing, or at least two of the following symptoms: chills, shaking with chills, muscle pain, headache, sore throat, and loss of taste or smell. Symptoms can range from mild to severe and may appear up to two weeks after exposure to the virus.    The practice of self-isolation and quarantine helps protect the public and your family by  preventing exposure to people who have or may have a contagious disease. Please follow the prevention steps below as based on CDC guidelines:    WHEN TO STOP ISOLATION: Persons with COVID-19 or any other infectious respiratory illness who have symptoms and were advised to care for themselves at home may discontinue home isolation under the following conditions:  · At least 24 hours have passed since recovery defined as resolution of fever without the use of fever-reducing medications; AND,  · Improvement in respiratory symptoms (e.g., cough, shortness of breath); AND,  · At least 10 days have passed since symptoms first appeared and have had no subsequent illness.    MONITOR YOUR SYMPTOMS: If your illness is worsening, seek prompt medical attention. If you have a medical emergency and need to call 911, notify the dispatch personnel that you have, or are being evaluated for confirmed or suspected COVID-19 or  another infectious respiratory illness. Wear a facemask if possible.    ACTIVITY RESTRICTION: restrict activities outside your home, except for getting medical care. Do not go to work, school, or public areas. Avoid using public transportation, ride-sharing, or taxis.    SCHEDULED MEDICAL APPOINTMENTS: Notify your provider that you have, or are being evaluated for, confirmed or suspected COVID-19 or another infectious respiratory. This will help the healthcare provider’s office safely take care of you and keep other people from getting exposed or infected.    FACEMASKS, when to wear: Anytime you are away from your home or around other people or pets. If you are unable to wear one, maintain a minimum of 6 feet distancing from others.    LIVING ENVIRONMENT: Stay in a separate room from other people and pets. If possible, use a separate bathroom, have someone else care for your pets and avoid sharing household items. Any items used should be washed thoroughly with soap and water. Clean all “high-touch” surfaces every day. Use a household cleaning spray or wipe, according to the label instructions. High touch surfaces include (but are not limited to) counters, tabletops, doorknobs, bathroom fixtures, toilets, phones, keyboards, tablets, and bedside tables.     HAND WASHING: Frequently wash hands with soap and water for at least 20 seconds,  especially after blowing your nose, coughing, or sneezing; going to the bathroom; before and after interacting with pets; and before and after eating or preparing food. If hands are visibly dirty use soap and water. If soap and water are not available, use an alcohol-based hand  with at least 60% alcohol. Avoid touching your eyes, nose, and mouth with unwashed hands. Cover your coughs and sneezes with a tissue. Throw used tissues in a lined trash can. Immediately wash your hands.    ACTIVE/FACILITATED SELF-MONITORING: Follow instructions provided by your local health  department or health professionals, as appropriate. When working with your local health department check their available hours.    [facility]   Phone Number   [place] [phone]   Nura Lindquist [place], [place] [phone]   [place] [address]   [place] [phone]     IF YOU HAVE CONFIRMED POSITIVE COVID-19:    Those who have completely recovered from COVID-19 may have immune-boosting antibodies in their plasma--called “convalescent plasma”--that could be used to treat critically ill COVID19 patients.    [facility] is excited to begin working with [employer] on collecting convalescent plasma from  people who have recovered from COVID-19 as part of a program to treat patients infected with the virus. This FDA-approved “emergency investigational new drug” is a special blood product containing antibodies that may give patients an extra boost to fight the virus.    To be eligible to donate convalescent plasma, you must have a prior COVID-19 diagnosis documented by a laboratory test (or a positive test result for SARS-CoV-2 antibodies) and meet additional eligibility requirements.    If you are interested in donating convalescent plasma or have any additional questions, please contact the [facility] Convalescent Plasma  at [phone] or via e-mail at [email or web address]: How to Protect Yourself and Others  Know how it spreads  · There is currently no vaccine to prevent coronavirus disease 2019 (COVID-19).  · The best way to prevent illness is to avoid being exposed to this virus.  · The virus is thought to spread mainly from person-to-person.  ? Between people who are in close contact with one another (within about 6 feet).  ? Through respiratory droplets produced when an infected person coughs, sneezes or talks.  ? These droplets can land in the mouths or noses of people who are nearby or possibly be inhaled into the lungs.  ? Some recent studies have suggested that COVID-19  may be spread by people who are not showing symptoms.  Everyone should  Clean your hands often  · Wash your hands often with soap and water for at least 20 seconds especially after you have been in a public place, or after blowing your nose, coughing, or sneezing.  · If soap and water are not readily available, use a hand  that contains at least 60% alcohol. Cover all surfaces of your hands and rub them together until they feel dry.  · Avoid touching your eyes, nose, and mouth with unwashed hands.  Avoid close contact  · Stay home if you are sick.  · Avoid close contact with people who are sick.  · Put distance between yourself and other people.  ? Remember that some people without symptoms may be able to spread virus.  ? This is especially important for people who are at higher risk of getting very sick.www.cdc.gov/coronavirus/2019-ncov/need-extra-precautions/people-at-higher-risk.html  Cover your mouth and nose with a cloth face cover when around others  · You could spread COVID-19 to others even if you do not feel sick.  · Everyone should wear a cloth face cover when they have to go out in public, for example to the grocery store or to  other necessities.  ? Cloth face coverings should not be placed on young children under age 2, anyone who has trouble breathing, or is unconscious, incapacitated or otherwise unable to remove the mask without assistance.  · The cloth face cover is meant to protect other people in case you are infected.  · Do NOT use a facemask meant for a healthcare worker.  · Continue to keep about 6 feet between yourself and others. The cloth face cover is not a substitute for social distancing.  Cover coughs and sneezes  · If you are in a private setting and do not have on your cloth face covering, remember to always cover your mouth and nose with a tissue when you cough or sneeze or use the inside of your elbow.  · Throw used tissues in the trash.  · Immediately wash your hands  with soap and water for at least 20 seconds. If soap and water are not readily available, clean your hands with a hand  that contains at least 60% alcohol.  Clean and disinfect  · Clean AND disinfect frequently touched surfaces daily. This includes tables, doorknobs, light switches, countertops, handles, desks, phones, keyboards, toilets, faucets, and sinks. www.cdc.gov/coronavirus/2019-ncov/prevent-getting-sick/disinfecting-your-home.html  · If surfaces are dirty, clean them: Use detergent or soap and water prior to disinfection.  · Then, use a household disinfectant. You can see a list of EPA-registered household disinfectants here.  cdc.gov/coronavirus  05/05/2020  This information is not intended to replace advice given to you by your health care provider. Make sure you discuss any questions you have with your health care provider.  Document Released: 04/14/2020 Document Revised: 05/13/2020 Document Reviewed: 04/14/2020  Elsevier Patient Education © 2020 [employer]  COVID-19 Frequently Asked Questions  COVID-19 (coronavirus disease) is an infection that is caused by a large family of viruses. Some viruses cause illness in people and others cause illness in animals like camels, cats, and bats. In some cases, the viruses that cause illness in animals can spread to humans.  Where did the coronavirus come from?  In December 2019, [place] told the World Health Organization (WHO) of several cases of lung disease (human respiratory illness). These cases were linked to an open seafood and livestock market in the [facility]. The link to the seafood and livestock market suggests that the virus may have spread from animals to humans. However, since that first outbreak in December, the virus has also been shown to spread from person to person.  What is the name of the disease and the virus?  Disease name  Early on, this disease was called novel coronavirus. This is because scientists determined that the disease  was caused by a new (novel) respiratory virus. The World Health Organization (WHO) has now named the disease COVID-19, or coronavirus disease.  Virus name  The virus that causes the disease is called severe acute respiratory syndrome coronavirus 2 (SARS-CoV-2).  More information on disease and virus naming  World Health Organization (WHO): www.who.int/emergencies/diseases/novel-coronavirus-2019/technical-guidance/naming-the-coronavirus-disease-(covid-2019)-and-the-virus-that-causes-it  Who is at risk for complications from coronavirus disease?  Some people may be at higher risk for complications from coronavirus disease. This includes older adults and people who have chronic diseases, such as heart disease, diabetes, and lung disease.  If you are at higher risk for complications, take these extra precautions:  · Avoid close contact with people who are sick or have a fever or cough. Stay at least 3-6 ft (1-2 m) away from them, if possible.  · Wash your hands often with soap and water for at least 20 seconds.  · Avoid touching your face, mouth, nose, or eyes.  · Keep supplies on hand at home, such as food, medicine, and cleaning supplies.  · Stay home as much as possible.  · Avoid social gatherings and travel.  How does coronavirus disease spread?  The virus that causes coronavirus disease spreads easily from person to person (is contagious). There are also cases of community-spread disease. This means the disease has spread to:  · People who have no known contact with other infected people.  · People who have not traveled to areas where there are known cases.  It appears to spread from one person to another through droplets from coughing or sneezing.  Can I get the virus from touching surfaces or objects?  There is still a lot that we do not know about the virus that causes coronavirus disease. Scientists are basing a lot of information on what they know about similar viruses, such as:  · Viruses cannot generally  survive on surfaces for long. They need a human body (host) to survive.  · It is more likely that the virus is spread by close contact with people who are sick (direct contact), such as through:  ? Shaking hands or hugging.  ? Breathing in respiratory droplets that travel through the air. This can happen when an infected person coughs or sneezes on or near other people.  · It is less likely that the virus is spread when a person touches a surface or object that has the virus on it (indirect contact). The virus may be able to enter the body if the person touches a surface or object and then touches his or her face, eyes, nose, or mouth.  Can a person spread the virus without having symptoms of the disease?  It may be possible for the virus to spread before a person has symptoms of the disease, but this is most likely not the main way the virus is spreading. It is more likely for the virus to spread by being in close contact with people who are sick and breathing in the respiratory droplets of a sick person's cough or sneeze.  What are the symptoms of coronavirus disease?  Symptoms vary from person to person and can range from mild to severe. Symptoms may include:  · Fever.  · Cough.  · Tiredness, weakness, or fatigue.  · Fast breathing or feeling short of breath.  These symptoms can appear anywhere from 2 to 14 days after you have been exposed to the virus. If you develop symptoms, call your health care provider. People with severe symptoms may need hospital care.  If I am exposed to the virus, how long does it take before symptoms start?  Symptoms of coronavirus disease may appear anywhere from 2 to 14 days after a person has been exposed to the virus. If you develop symptoms, call your health care provider.  Should I be tested for this virus?  Your health care provider will decide whether to test you based on your symptoms, history of exposure, and your risk factors.  How does a health care provider test for this  virus?  Health care providers will collect samples to send for testing. Samples may include:  · Taking a swab of fluid from the nose.  · Taking fluid from the lungs by having you cough up mucus (sputum) into a sterile cup.  · Taking a blood sample.  · Taking a stool or urine sample.  Is there a treatment or vaccine for this virus?  Currently, there is no vaccine to prevent coronavirus disease. Also, there are no medicines like antibiotics or antivirals to treat the virus. A person who becomes sick is given supportive care, which means rest and fluids. A person may also relieve his or her symptoms by using over-the-counter medicines that treat sneezing, coughing, and runny nose. These are the same medicines that a person takes for the common cold.  If you develop symptoms, call your health care provider. People with severe symptoms may need hospital care.  What can I do to protect myself and my family from this virus?         You can protect yourself and your family by taking the same actions that you would take to prevent the spread of other viruses. Take the following actions:  · Wash your hands often with soap and water for at least 20 seconds. If soap and water are not available, use alcohol-based hand .  · Avoid touching your face, mouth, nose, or eyes.  · Cough or sneeze into a tissue, sleeve, or elbow. Do not cough or sneeze into your hand or the air.  ? If you cough or sneeze into a tissue, throw it away immediately and wash your hands.  · Disinfect objects and surfaces that you frequently touch every day.  · Avoid close contact with people who are sick or have a fever or cough. Stay at least 3-6 ft (1-2 m) away from them, if possible.  · Stay home if you are sick, except to get medical care. Call your health care provider before you get medical care.  · Make sure your vaccines are up to date. Ask your health care provider what vaccines you need.  What should I do if I need to travel?  Follow travel  recommendations from your local health authority, the CDC, and WHO.  Travel information and advice  · Centers for Disease Control and Prevention (CDC): www.cdc.gov/coronavirus/2019-ncov/travelers/index.html  · World Health Organization (WHO): www.who.int/emergencies/diseases/novel-coronavirus-2019/travel-advice  Know the risks and take action to protect your health  · You are at higher risk of getting coronavirus disease if you are traveling to areas with an outbreak or if you are exposed to travelers from areas with an outbreak.  · Wash your hands often and practice good hygiene to lower the risk of catching or spreading the virus.  What should I do if I am sick?  General instructions to stop the spread of infection  · Wash your hands often with soap and water for at least 20 seconds. If soap and water are not available, use alcohol-based hand .  · Cough or sneeze into a tissue, sleeve, or elbow. Do not cough or sneeze into your hand or the air.  · If you cough or sneeze into a tissue, throw it away immediately and wash your hands.  · Stay home unless you must get medical care. Call your health care provider or local health authority before you get medical care.  · Avoid public areas. Do not take public transportation, if possible.  · If you can, wear a mask if you must go out of the house or if you are in close contact with someone who is not sick.  Keep your home clean  · Disinfect objects and surfaces that are frequently touched every day. This may include:  ? Counters and tables.  ? Doorknobs and light switches.  ? Sinks and faucets.  ? Electronics such as phones, remote controls, keyboards, computers, and tablets.  · Wash dishes in hot, soapy water or use a . Air-dry your dishes.  · Wash laundry in hot water.  Prevent infecting other household members  · Let healthy household members care for children and pets, if possible. If you have to care for children or pets, wash your hands often and  wear a mask.  · Sleep in a different bedroom or bed, if possible.  · Do not share personal items, such as razors, toothbrushes, deodorant, scott, brushes, towels, and washcloths.  Where to find more information  Centers for Disease Control and Prevention (CDC)  · Information and news updates: www.cdc.gov/coronavirus/2019-ncov  World Health Organization (WHO)  · Information and news updates: www.who.int/emergencies/diseases/novel-coronavirus-2019  · Coronavirus health topic: www.who.int/health-topics/coronavirus  · Questions and answers on COVID-19: www.who.int/news-room/q-a-detail/o-a-zqoxtnllhdzvd  · Global tracker: [facility]Acsis  American Academy of Pediatrics (AAP)  · Information for families: www.healthychildren.org/English/health-issues/conditions/chest-lungs/Pages/2019-Novel-Coronavirus.aspx  The coronavirus situation is changing rapidly. Check your local health authority website or the CDC and WHO websites for updates and news.  When should I contact a health care provider?  · Contact your health care provider if you have symptoms of an infection, such as fever or cough, and you:  ? Have been near anyone who is known to have coronavirus disease.  ? Have come into contact with a person who is suspected to have coronavirus disease.  ? Have traveled outside of the country.  When should I get emergency medical care?  · Get help right away by calling your local emergency services (911 in the [place] if you have:  ? Trouble breathing.  ? Pain or pressure in your chest.  ? Confusion.  ? Blue-tinged lips and fingernails.  ? Difficulty waking from sleep.  ? Symptoms that get worse.  Let the emergency medical personnel know if you think you have coronavirus disease.  Summary  · A new respiratory virus is spreading from person to person and causing COVID-19 (coronavirus disease).  · The virus that causes COVID-19 appears to spread easily. It spreads from one person to another through droplets from coughing or  sneezing.  · Older adults and those with chronic diseases are at higher risk of disease. If you are at higher risk for complications, take extra precautions.  · There is currently no vaccine to prevent coronavirus disease. There are no medicines, such as antibiotics or antivirals, to treat the virus.  · You can protect yourself and your family by washing your hands often, avoiding touching your face, and covering your coughs and sneezes.  This information is not intended to replace advice given to you by your health care provider. Make sure you discuss any questions you have with your health care provider.  Document Released: 04/14/2020 Document Revised: 04/14/2020 Document Reviewed: 04/14/2020  Elsevier Patient Education © 2020 [employer]